# Patient Record
Sex: MALE | Race: WHITE | Employment: FULL TIME | URBAN - METROPOLITAN AREA
[De-identification: names, ages, dates, MRNs, and addresses within clinical notes are randomized per-mention and may not be internally consistent; named-entity substitution may affect disease eponyms.]

---

## 2019-12-06 ENCOUNTER — OFFICE VISIT (OUTPATIENT)
Dept: FAMILY MEDICINE CLINIC | Facility: CLINIC | Age: 29
End: 2019-12-06
Payer: COMMERCIAL

## 2019-12-06 VITALS
RESPIRATION RATE: 18 BRPM | WEIGHT: 315 LBS | SYSTOLIC BLOOD PRESSURE: 140 MMHG | TEMPERATURE: 99 F | HEART RATE: 86 BPM | OXYGEN SATURATION: 98 % | HEIGHT: 72 IN | BODY MASS INDEX: 42.66 KG/M2 | DIASTOLIC BLOOD PRESSURE: 90 MMHG

## 2019-12-06 DIAGNOSIS — Z23 NEED FOR VACCINATION: Primary | ICD-10-CM

## 2019-12-06 DIAGNOSIS — J01.00 ACUTE NON-RECURRENT MAXILLARY SINUSITIS: ICD-10-CM

## 2019-12-06 DIAGNOSIS — J40 BRONCHITIS: ICD-10-CM

## 2019-12-06 PROCEDURE — 99202 OFFICE O/P NEW SF 15 MIN: CPT | Performed by: FAMILY MEDICINE

## 2019-12-06 PROCEDURE — 90471 IMMUNIZATION ADMIN: CPT | Performed by: FAMILY MEDICINE

## 2019-12-06 PROCEDURE — 90686 IIV4 VACC NO PRSV 0.5 ML IM: CPT | Performed by: FAMILY MEDICINE

## 2019-12-06 RX ORDER — AMOXICILLIN AND CLAVULANATE POTASSIUM 875; 125 MG/1; MG/1
1 TABLET, FILM COATED ORAL 2 TIMES DAILY
Qty: 20 TABLET | Refills: 0 | Status: SHIPPED | OUTPATIENT
Start: 2019-12-06 | End: 2019-12-16

## 2019-12-06 RX ORDER — PREDNISONE 20 MG/1
TABLET ORAL
Qty: 10 TABLET | Refills: 0 | Status: SHIPPED | OUTPATIENT
Start: 2019-12-06 | End: 2020-03-13 | Stop reason: ALTCHOICE

## 2019-12-06 NOTE — PROGRESS NOTES
CHIEF COMPLAINT:   Patient presents with:  Cough: x 1mo congestion, tired, bodyaches, cough, chest pressure        HPI:   Lisa Ramirez is a 26year old male who presents for cough and cold sx for  1  months.   Cough started gradually and is described Atraumatic, normocephalic. TM's clear bilaterally. Nostrils patent, nasal mucosa erythematous and boggy. No erythema of the throat. NECK: supple, non-tender. LUNGS: Normal respiratory rate. Normal effort. Dry cough. no wheezing. No rales or crackles. understanding of these issues and agrees to the plan. The patient is asked to return if sx's persist or worsen.

## 2019-12-06 NOTE — PATIENT INSTRUCTIONS
Take antibiotics with food and plenty of water. Eat yogurt or take probiotic daily. ([facility] is a good example of an OTC probiotic)  Make sure to finish the entire antibiotic treatment. Take prednisone-- 2 tabs once daily for 5 days. Take with food.    Veronica Hudson

## 2020-03-13 ENCOUNTER — OFFICE VISIT (OUTPATIENT)
Dept: FAMILY MEDICINE CLINIC | Facility: CLINIC | Age: 30
End: 2020-03-13
Payer: COMMERCIAL

## 2020-03-13 VITALS
TEMPERATURE: 100 F | BODY MASS INDEX: 42.66 KG/M2 | SYSTOLIC BLOOD PRESSURE: 130 MMHG | HEIGHT: 72 IN | DIASTOLIC BLOOD PRESSURE: 70 MMHG | RESPIRATION RATE: 16 BRPM | WEIGHT: 315 LBS | HEART RATE: 90 BPM

## 2020-03-13 DIAGNOSIS — Z00.00 ROUTINE HEALTH MAINTENANCE: Primary | ICD-10-CM

## 2020-03-13 PROCEDURE — 99385 PREV VISIT NEW AGE 18-39: CPT | Performed by: FAMILY MEDICINE

## 2020-03-13 NOTE — PROGRESS NOTES
Patient presents with:  Establish Care: New Pt  Physical    HPI:  Notes that he feels like he is falling apart now that he is 26. Having more aches and pains. Also has concerns about weight.  Notes that even when he was training for a marathon, lowest w obvious abnormality, atraumatic  Ears: normal TM's and external ear canals both ears  Nose: Nares normal. Septum midline. Mucosa normal. No drainage or sinus tenderness.   Throat: lips, mucosa, and tongue normal; teeth and gums normal  Neck: no adenopathy,

## 2020-09-23 PROBLEM — F41.1 GENERALIZED ANXIETY DISORDER: Status: ACTIVE | Noted: 2020-09-23

## 2020-12-17 NOTE — PROGRESS NOTES
Virtual Telephone Check-In    Hyland Hodgkin verbally consents to a Virtual/Telephone Check-In visit on 12/17/20. Patient has been referred to the [facility] website at [email or web address] to review the yearly Consent to Treat document.     Patient unde

## 2021-04-13 ENCOUNTER — TELEPHONE (OUTPATIENT)
Dept: FAMILY MEDICINE CLINIC | Facility: CLINIC | Age: 31
End: 2021-04-13

## 2021-04-13 NOTE — TELEPHONE ENCOUNTER
Please enter lab orders for the patient's upcoming physical appointment. Physical scheduled:    Your appointments     Date & Time Appointment Department [facility])    Apr 20, 2021  4:40 PM CDT Adult Physical with Hellen Ba  [facility], [facility]

## 2021-04-20 ENCOUNTER — OFFICE VISIT (OUTPATIENT)
Dept: FAMILY MEDICINE CLINIC | Facility: CLINIC | Age: 31
End: 2021-04-20
Payer: COMMERCIAL

## 2021-04-20 VITALS
RESPIRATION RATE: 16 BRPM | BODY MASS INDEX: 42.66 KG/M2 | DIASTOLIC BLOOD PRESSURE: 90 MMHG | HEART RATE: 88 BPM | WEIGHT: 315 LBS | OXYGEN SATURATION: 98 % | SYSTOLIC BLOOD PRESSURE: 160 MMHG | TEMPERATURE: 98 F | HEIGHT: 72 IN

## 2021-04-20 DIAGNOSIS — R20.2 NUMBNESS AND TINGLING IN LEFT HAND: ICD-10-CM

## 2021-04-20 DIAGNOSIS — R20.0 NUMBNESS AND TINGLING IN LEFT HAND: ICD-10-CM

## 2021-04-20 DIAGNOSIS — Z00.00 ROUTINE HEALTH MAINTENANCE: Primary | ICD-10-CM

## 2021-04-20 PROCEDURE — 3008F BODY MASS INDEX DOCD: CPT | Performed by: FAMILY MEDICINE

## 2021-04-20 PROCEDURE — 99395 PREV VISIT EST AGE 18-39: CPT | Performed by: FAMILY MEDICINE

## 2021-04-20 PROCEDURE — 3077F SYST BP >= 140 MM HG: CPT | Performed by: FAMILY MEDICINE

## 2021-04-20 PROCEDURE — 3080F DIAST BP >= 90 MM HG: CPT | Performed by: FAMILY MEDICINE

## 2021-04-20 NOTE — PROGRESS NOTES
Patient presents with:  Physical  Numbness: Recieved Moderna vaccine on 03-, numbness on left hand 10 min after receiving the vaccine, has second dose schedule on 03-    HPI:  Notes that he has numbness and tingling in his L hand following wi never smoked. He has never used smokeless tobacco.    ROS:   Review of Systems     HISTORY:  History reviewed. No pertinent past medical history. History reviewed. No pertinent surgical history.    Family History   Adopted: Yes   Problem Relation Age of O Grant Pichardo was seen today for physical and numbness. Diagnoses and all orders for this visit:    Numbness and tingling in left hand  Will give trial of treating like carpal tunnel with braces.      Routine health maintenance  -     CBC, PLATELET; NO DI

## 2021-05-18 ENCOUNTER — OFFICE VISIT (OUTPATIENT)
Dept: FAMILY MEDICINE CLINIC | Facility: CLINIC | Age: 31
End: 2021-05-18
Payer: COMMERCIAL

## 2021-05-18 VITALS
HEIGHT: 72 IN | RESPIRATION RATE: 14 BRPM | WEIGHT: 315 LBS | BODY MASS INDEX: 42.66 KG/M2 | HEART RATE: 94 BPM | OXYGEN SATURATION: 98 % | SYSTOLIC BLOOD PRESSURE: 170 MMHG | DIASTOLIC BLOOD PRESSURE: 100 MMHG | TEMPERATURE: 98 F

## 2021-05-18 DIAGNOSIS — R05.3 CHRONIC COUGH: ICD-10-CM

## 2021-05-18 DIAGNOSIS — E66.01 MORBID OBESITY (HCC): ICD-10-CM

## 2021-05-18 DIAGNOSIS — I10 ESSENTIAL HYPERTENSION: Primary | ICD-10-CM

## 2021-05-18 PROCEDURE — 99214 OFFICE O/P EST MOD 30 MIN: CPT | Performed by: FAMILY MEDICINE

## 2021-05-18 PROCEDURE — 3080F DIAST BP >= 90 MM HG: CPT | Performed by: FAMILY MEDICINE

## 2021-05-18 PROCEDURE — 3077F SYST BP >= 140 MM HG: CPT | Performed by: FAMILY MEDICINE

## 2021-05-18 PROCEDURE — 3008F BODY MASS INDEX DOCD: CPT | Performed by: FAMILY MEDICINE

## 2021-05-18 RX ORDER — AMLODIPINE BESYLATE 5 MG/1
5 TABLET ORAL DAILY
Qty: 30 TABLET | Refills: 1 | Status: SHIPPED | OUTPATIENT
Start: 2021-05-18 | End: 2021-06-15 | Stop reason: ALTCHOICE

## 2021-05-18 NOTE — PROGRESS NOTES
Chief Complaint:  Patient presents with:  Blood Pressure: BP Follow Up      HPI:  This is a 31year old male patient presenting for Blood Pressure (BP Follow Up)    Elevated BP: Noted last visit. Started monitoring at home. Most readings 130-170s/.  D 170/100   Pulse: 94   Resp: 14   Temp: 97.7 °F (36.5 °C)   TempSrc: Temporal   SpO2: 98%   Weight: (!) 344 lb (156 kg)   Height: 6' (1.829 m)     GENERAL: vitals reviewed and listed above, alert, oriented, appears well hydrated and in no acute distress  HE

## 2021-05-24 ENCOUNTER — PATIENT MESSAGE (OUTPATIENT)
Dept: FAMILY MEDICINE CLINIC | Facility: CLINIC | Age: 31
End: 2021-05-24

## 2021-05-24 NOTE — TELEPHONE ENCOUNTER
From: Florence Luxtingham  To: Evin Stroud DO  Sent: 5/24/2021 11:20 AM CDT  Subject: Other    Hi Dr. Nicolle Rodriguez,    I just wanted to give you the one-week update of me having started taking Amlodipine; so far, not huge changes from my perspective.  I've left

## 2021-06-01 ENCOUNTER — PATIENT MESSAGE (OUTPATIENT)
Dept: FAMILY MEDICINE CLINIC | Facility: CLINIC | Age: 31
End: 2021-06-01

## 2021-06-01 DIAGNOSIS — I10 ESSENTIAL HYPERTENSION: Primary | ICD-10-CM

## 2021-06-01 RX ORDER — AMLODIPINE BESYLATE 10 MG/1
10 TABLET ORAL DAILY
Qty: 90 TABLET | Refills: 0 | Status: SHIPPED | OUTPATIENT
Start: 2021-06-01 | End: 2021-08-28

## 2021-06-01 NOTE — TELEPHONE ENCOUNTER
From: Bari Spangler  To:  Deana Chacon DO  Sent: 6/1/2021 9:20 AM CDT  Subject: Non-Urgent Medical Question    Hi Dr Delma May,    Please see attached for my blood pressure readings — we doubled my dose of amlodipine on the 25th and it looks like there'

## 2021-06-15 ENCOUNTER — OFFICE VISIT (OUTPATIENT)
Dept: FAMILY MEDICINE CLINIC | Facility: CLINIC | Age: 31
End: 2021-06-15
Payer: COMMERCIAL

## 2021-06-15 VITALS
WEIGHT: 315 LBS | RESPIRATION RATE: 16 BRPM | BODY MASS INDEX: 42.66 KG/M2 | TEMPERATURE: 98 F | SYSTOLIC BLOOD PRESSURE: 140 MMHG | DIASTOLIC BLOOD PRESSURE: 70 MMHG | HEART RATE: 70 BPM | HEIGHT: 72 IN

## 2021-06-15 DIAGNOSIS — E66.01 MORBID OBESITY (HCC): ICD-10-CM

## 2021-06-15 DIAGNOSIS — I10 ESSENTIAL HYPERTENSION: Primary | ICD-10-CM

## 2021-06-15 DIAGNOSIS — R05.3 CHRONIC COUGH: ICD-10-CM

## 2021-06-15 PROCEDURE — 3078F DIAST BP <80 MM HG: CPT | Performed by: FAMILY MEDICINE

## 2021-06-15 PROCEDURE — 3008F BODY MASS INDEX DOCD: CPT | Performed by: FAMILY MEDICINE

## 2021-06-15 PROCEDURE — 99214 OFFICE O/P EST MOD 30 MIN: CPT | Performed by: FAMILY MEDICINE

## 2021-06-15 PROCEDURE — 3077F SYST BP >= 140 MM HG: CPT | Performed by: FAMILY MEDICINE

## 2021-06-15 RX ORDER — LOSARTAN POTASSIUM 25 MG/1
25 TABLET ORAL DAILY
Qty: 30 TABLET | Refills: 1 | Status: SHIPPED | OUTPATIENT
Start: 2021-06-15 | End: 2021-07-02

## 2021-06-15 NOTE — PROGRESS NOTES
Chief Complaint:  Patient presents with:  Blood Pressure: 1 month Follow Up       HPI:  This is a 31year old male patient presenting for Blood Pressure (1 month Follow Up )    Essential hypertension  Increased to 10mg amlodipine after last visit.  Notes th Take by mouth.        Allergies:    Fennel                  Tightness in Throat, TONGUE                            SWELLING  Shellfish-Derived P*    ANAPHYLAXIS    EXAM:   06/15/21  1120   BP: 140/70   Pulse: 70   Resp: 16   Temp: 97.9 °F (36.6 °C)   TempSr

## 2021-06-16 ENCOUNTER — PATIENT MESSAGE (OUTPATIENT)
Dept: FAMILY MEDICINE CLINIC | Facility: CLINIC | Age: 31
End: 2021-06-16

## 2021-06-16 NOTE — TELEPHONE ENCOUNTER
From: Nolvia Medina  To:  Toro Urbina DO  Sent: 6/16/2021 9:27 AM CDT  Subject: Non-Urgent Medical Question    Hi Dr Natalya Niño,    Not alarmed necessarily, but I wanted to flag that today I did my morning walk with Shayla Raymond and found that my resting heart

## 2021-06-29 ENCOUNTER — PATIENT MESSAGE (OUTPATIENT)
Dept: FAMILY MEDICINE CLINIC | Facility: CLINIC | Age: 31
End: 2021-06-29

## 2021-06-29 NOTE — TELEPHONE ENCOUNTER
From: Tamia Huang  To: Yonny Pinedo DO  Sent: 6/29/2021 6:58 AM CDT  Subject: Other    Hi Dr Sharlotte Boxer,    Here's my blood pressure update. Some progress, though I've seen my diastatic blood pressure drop a little lower than expected once or twice.

## 2021-07-02 ENCOUNTER — TELEPHONE (OUTPATIENT)
Dept: FAMILY MEDICINE CLINIC | Facility: CLINIC | Age: 31
End: 2021-07-02

## 2021-07-02 DIAGNOSIS — I10 ESSENTIAL HYPERTENSION: ICD-10-CM

## 2021-07-02 RX ORDER — LOSARTAN POTASSIUM 50 MG/1
50 TABLET ORAL DAILY
Qty: 90 TABLET | Refills: 0 | Status: SHIPPED | OUTPATIENT
Start: 2021-07-02 | End: 2021-08-28

## 2021-07-02 NOTE — TELEPHONE ENCOUNTER
Pt has been in communication with Dr Vic Alcantara about increasing his Losartan Potassium 25 MG Oral Tab to 50mg and that seems to be working but he will be out of medication before the weekend is over because of the increase can we send over a Rx for 50mg so he

## 2021-07-06 ENCOUNTER — OFFICE VISIT (OUTPATIENT)
Dept: INTERNAL MEDICINE CLINIC | Facility: CLINIC | Age: 31
End: 2021-07-06
Payer: COMMERCIAL

## 2021-07-06 VITALS
WEIGHT: 315 LBS | RESPIRATION RATE: 16 BRPM | HEART RATE: 78 BPM | HEIGHT: 71 IN | BODY MASS INDEX: 44.1 KG/M2 | DIASTOLIC BLOOD PRESSURE: 68 MMHG | SYSTOLIC BLOOD PRESSURE: 130 MMHG

## 2021-07-06 DIAGNOSIS — F43.9 STRESS: ICD-10-CM

## 2021-07-06 DIAGNOSIS — R06.83 SNORING: ICD-10-CM

## 2021-07-06 DIAGNOSIS — R94.31 ABNORMAL EKG: ICD-10-CM

## 2021-07-06 DIAGNOSIS — F41.1 GENERALIZED ANXIETY DISORDER: ICD-10-CM

## 2021-07-06 DIAGNOSIS — Z51.81 THERAPEUTIC DRUG MONITORING: Primary | ICD-10-CM

## 2021-07-06 DIAGNOSIS — I10 ESSENTIAL HYPERTENSION: ICD-10-CM

## 2021-07-06 DIAGNOSIS — E66.01 MORBID OBESITY WITH BMI OF 40.0-44.9, ADULT (HCC): ICD-10-CM

## 2021-07-06 DIAGNOSIS — Z83.3 FAMILY HISTORY OF DIABETES MELLITUS IN FATHER: ICD-10-CM

## 2021-07-06 PROCEDURE — 3075F SYST BP GE 130 - 139MM HG: CPT | Performed by: NURSE PRACTITIONER

## 2021-07-06 PROCEDURE — 99204 OFFICE O/P NEW MOD 45 MIN: CPT | Performed by: NURSE PRACTITIONER

## 2021-07-06 PROCEDURE — 93000 ELECTROCARDIOGRAM COMPLETE: CPT | Performed by: NURSE PRACTITIONER

## 2021-07-06 PROCEDURE — 3078F DIAST BP <80 MM HG: CPT | Performed by: NURSE PRACTITIONER

## 2021-07-06 PROCEDURE — 3008F BODY MASS INDEX DOCD: CPT | Performed by: NURSE PRACTITIONER

## 2021-07-06 NOTE — PROGRESS NOTES
HISTORY OF PRESENT ILLNESS  Patient presents with:  Weight Problem: referred by Majo Clement, never tried any weight loss meds, open to trying if necessary.     Pattie Carlos Manuel is a 27year old male new to our office today for initiation of medical weight l cheese. Regularly target 800-1000 rebeca, last night was a homemade chicken & chickpea tikka (400 g, ~330 rebeca) with 3/4 cup white rice (154 rebeca). Typically i would have a second serving but felt full so did not this time.  2x granny miller apples, bag of popcor denies palpitations or pedal edema  GI: denies abdominal pain, distention, No N/V/D/C  MUSCULOSKELETAL: denies back pain, joint pains   NEURO: denies headaches or dizziness  ENDOCRINE: denies any excess hunger, urination or thirst, denies any purple striae A1C, ELECTROCARDIOGRAM, COMPLETE, OP REFERRAL         TO DIETITIAN EMG WLC (WLC USE ONLY), CARD ECHO         STRESS ECHO/REST AND STRESS(CPT=93350/41894 DMG        15510), SARS-COV-2 BY PCR (ALIDAMIANTY)    (E66.01,  Z68.41) Morbid obesity with BMI of 40.0-4 skipping meals   · Needs to incorporate exercise regimen FITTE: ACSM recommendations (150-300 minutes/ week in active weight loss)   · Follow up with dietitian and psychologist as recommended. · Discussed the role of sleep and stress in weight management. protein or vegetables (4-6 servings of vegetables per day)  6. Get a good night of sleep  7. Try to decrease stress in life     Please download apps:  1.  \"My Fitness Pal\" (other option is Lose it)) to help you to monitor daily dietary intake and you will

## 2021-07-06 NOTE — PATIENT INSTRUCTIONS
We are here to support you with weight loss, but please remember that you still need your primary care provider for your routine health maintenance.       PLAN:  Go to the lab for blood work   Orders for stress echo  Follow up with me in 1 month  Schedule f section-- \"nutrients\" and it will break down your macros for the day (ie. Protein, carbs, fibers, sugars and fats). Try to stay within these numbers daily     2.  \"7 minute workout\" to help with exercise/activity which takes 7 minutes of your day and th

## 2021-07-13 ENCOUNTER — TELEPHONE (OUTPATIENT)
Dept: INTERNAL MEDICINE CLINIC | Facility: CLINIC | Age: 31
End: 2021-07-13

## 2021-07-13 ENCOUNTER — OFFICE VISIT (OUTPATIENT)
Dept: FAMILY MEDICINE CLINIC | Facility: CLINIC | Age: 31
End: 2021-07-13
Payer: COMMERCIAL

## 2021-07-13 VITALS
HEART RATE: 72 BPM | HEIGHT: 70.25 IN | WEIGHT: 313 LBS | BODY MASS INDEX: 44.81 KG/M2 | RESPIRATION RATE: 16 BRPM | SYSTOLIC BLOOD PRESSURE: 140 MMHG | TEMPERATURE: 98 F | OXYGEN SATURATION: 97 % | DIASTOLIC BLOOD PRESSURE: 82 MMHG

## 2021-07-13 DIAGNOSIS — I10 ESSENTIAL HYPERTENSION: ICD-10-CM

## 2021-07-13 DIAGNOSIS — F41.1 GENERALIZED ANXIETY DISORDER: ICD-10-CM

## 2021-07-13 DIAGNOSIS — R94.31 ABNORMAL EKG: ICD-10-CM

## 2021-07-13 DIAGNOSIS — R06.83 SNORING: ICD-10-CM

## 2021-07-13 DIAGNOSIS — F43.9 STRESS: ICD-10-CM

## 2021-07-13 DIAGNOSIS — E66.01 MORBID OBESITY WITH BMI OF 40.0-44.9, ADULT (HCC): Primary | ICD-10-CM

## 2021-07-13 DIAGNOSIS — I10 ESSENTIAL HYPERTENSION: Primary | ICD-10-CM

## 2021-07-13 DIAGNOSIS — E66.01 MORBID OBESITY (HCC): ICD-10-CM

## 2021-07-13 PROCEDURE — 3079F DIAST BP 80-89 MM HG: CPT | Performed by: FAMILY MEDICINE

## 2021-07-13 PROCEDURE — 99214 OFFICE O/P EST MOD 30 MIN: CPT | Performed by: FAMILY MEDICINE

## 2021-07-13 PROCEDURE — 3077F SYST BP >= 140 MM HG: CPT | Performed by: FAMILY MEDICINE

## 2021-07-13 PROCEDURE — 3008F BODY MASS INDEX DOCD: CPT | Performed by: FAMILY MEDICINE

## 2021-07-13 NOTE — TELEPHONE ENCOUNTER
Note from referral department regarding Stress Echo Test ordered - DENIED    Lacey Root [facility] Clinical Staff  Good Morning,     Per Dipak Wilkerson this test did not meet medical necessity and has been denied. Per their guidelines:     Based on [employer]

## 2021-07-13 NOTE — PROGRESS NOTES
Chief Complaint:  Patient presents with:  Blood Pressure: follow up    HPI:  This is a 27year old male patient presenting for Blood Pressure (follow up)    HTN: Last visit increased losartan to 50mg and cont amlodipine.  Notes that home readings vary from kg)   Height: 5' 10.25\" (1.784 m)     GENERAL: vitals reviewed and listed above, alert, oriented, appears well hydrated and in no acute distress  HEENT: atraumatic, conjunctiva clear, no obvious abnormalities on inspection   LUNGS: clear to auscultation b

## 2021-07-15 NOTE — TELEPHONE ENCOUNTER
Message to [facility] to ask if I can order the testing so it can be checked for coverage - okay per [facility]. Testing ordered.

## 2021-07-20 ENCOUNTER — PATIENT MESSAGE (OUTPATIENT)
Dept: FAMILY MEDICINE CLINIC | Facility: CLINIC | Age: 31
End: 2021-07-20

## 2021-07-21 NOTE — TELEPHONE ENCOUNTER
From: Mathew Moralez  To: Matt Click, DO  Sent: 7/20/2021 8:46 PM CDT  Subject: Other    Hi Dr Matias Likes,    Second to last day of work today -- here's my rolling blood pressure measurements!

## 2021-08-16 ENCOUNTER — OFFICE VISIT (OUTPATIENT)
Dept: FAMILY MEDICINE CLINIC | Facility: CLINIC | Age: 31
End: 2021-08-16
Payer: COMMERCIAL

## 2021-08-16 VITALS
SYSTOLIC BLOOD PRESSURE: 130 MMHG | HEART RATE: 88 BPM | RESPIRATION RATE: 16 BRPM | HEIGHT: 71.75 IN | WEIGHT: 311 LBS | TEMPERATURE: 97 F | DIASTOLIC BLOOD PRESSURE: 70 MMHG | BODY MASS INDEX: 42.59 KG/M2

## 2021-08-16 DIAGNOSIS — I10 ESSENTIAL HYPERTENSION: Primary | ICD-10-CM

## 2021-08-16 PROCEDURE — 3075F SYST BP GE 130 - 139MM HG: CPT | Performed by: FAMILY MEDICINE

## 2021-08-16 PROCEDURE — 3078F DIAST BP <80 MM HG: CPT | Performed by: FAMILY MEDICINE

## 2021-08-16 PROCEDURE — 3008F BODY MASS INDEX DOCD: CPT | Performed by: FAMILY MEDICINE

## 2021-08-16 PROCEDURE — 99213 OFFICE O/P EST LOW 20 MIN: CPT | Performed by: FAMILY MEDICINE

## 2021-08-16 NOTE — PROGRESS NOTES
Chief Complaint:  Patient presents with:  Blood Pressure: 1 month Follow Up, pt states he has been ranging 130/90s     HPI:  This is a 27year old male patient presenting for Blood Pressure (1 month Follow Up, pt states he has been ranging 130/90s )    HTN 11.75\" (1.822 m)     GENERAL: vitals reviewed and listed above, alert, oriented, appears well hydrated and in no acute distress  HEENT: atraumatic, conjunctiva clear, no obvious abnormalities on inspection   LUNGS: clear to auscultation bilaterally, no wh

## 2021-08-28 DIAGNOSIS — I10 ESSENTIAL HYPERTENSION: ICD-10-CM

## 2021-08-31 RX ORDER — AMLODIPINE BESYLATE 10 MG/1
10 TABLET ORAL DAILY
Qty: 90 TABLET | Refills: 0 | Status: SHIPPED | OUTPATIENT
Start: 2021-08-31 | End: 2021-12-02

## 2021-08-31 RX ORDER — LOSARTAN POTASSIUM 50 MG/1
50 TABLET ORAL DAILY
Qty: 90 TABLET | Refills: 0 | Status: SHIPPED | OUTPATIENT
Start: 2021-08-31 | End: 2021-12-02

## 2021-08-31 NOTE — TELEPHONE ENCOUNTER
Requested Prescriptions     Pending Prescriptions Disp Refills   • amLODIPine 10 MG Oral Tab 90 tablet 0     Sig: Take 1 tablet (10 mg total) by mouth daily.    • losartan Potassium 50 MG Oral Tab 90 tablet 0     Sig: Take 1 tablet (50 mg total) by mouth da

## 2021-10-12 ENCOUNTER — IMMUNIZATION (OUTPATIENT)
Dept: FAMILY MEDICINE CLINIC | Facility: CLINIC | Age: 31
End: 2021-10-12
Payer: COMMERCIAL

## 2021-10-12 DIAGNOSIS — Z23 NEED FOR VACCINATION: Primary | ICD-10-CM

## 2021-10-12 PROCEDURE — 90471 IMMUNIZATION ADMIN: CPT | Performed by: FAMILY MEDICINE

## 2021-10-12 PROCEDURE — 90686 IIV4 VACC NO PRSV 0.5 ML IM: CPT | Performed by: FAMILY MEDICINE

## 2021-11-12 ENCOUNTER — PATIENT MESSAGE (OUTPATIENT)
Dept: FAMILY MEDICINE CLINIC | Facility: CLINIC | Age: 31
End: 2021-11-12

## 2021-11-12 DIAGNOSIS — Z30.09 SCREENING AND EVALUATION FOR VASECTOMY: Primary | ICD-10-CM

## 2021-11-12 NOTE — TELEPHONE ENCOUNTER
From: Deisi Calderon  To: Vince Phelps DO  Sent: 11/12/2021 7:26 AM CST  Subject: Vasectomy     Hetony Lord. I was hoping to get a vasectomy — how would I go about getting that started?

## 2021-11-27 ENCOUNTER — IMMUNIZATION (OUTPATIENT)
Dept: LAB | Facility: HOSPITAL | Age: 31
End: 2021-11-27
Attending: EMERGENCY MEDICINE
Payer: COMMERCIAL

## 2021-11-27 DIAGNOSIS — Z23 NEED FOR VACCINATION: Primary | ICD-10-CM

## 2021-11-27 PROCEDURE — 0064A SARSCOV2 VAC 50MCG/0.25ML IM: CPT

## 2021-12-02 DIAGNOSIS — I10 ESSENTIAL HYPERTENSION: ICD-10-CM

## 2021-12-03 RX ORDER — LOSARTAN POTASSIUM 50 MG/1
50 TABLET ORAL DAILY
Qty: 90 TABLET | Refills: 0 | Status: SHIPPED | OUTPATIENT
Start: 2021-12-03 | End: 2022-01-10

## 2021-12-03 RX ORDER — AMLODIPINE BESYLATE 10 MG/1
10 TABLET ORAL DAILY
Qty: 90 TABLET | Refills: 0 | Status: SHIPPED | OUTPATIENT
Start: 2021-12-03

## 2021-12-03 NOTE — TELEPHONE ENCOUNTER
Requested Prescriptions     Pending Prescriptions Disp Refills   • amLODIPine 10 MG Oral Tab 90 tablet 0     Sig: Take 1 tablet (10 mg total) by mouth daily. • losartan 50 MG Oral Tab 90 tablet 0     Sig: Take 1 tablet (50 mg total) by mouth daily.      L

## 2022-01-07 DIAGNOSIS — I10 ESSENTIAL HYPERTENSION: ICD-10-CM

## 2022-01-07 RX ORDER — LOSARTAN POTASSIUM 50 MG/1
50 TABLET ORAL DAILY
Qty: 90 TABLET | Refills: 0 | Status: CANCELLED | OUTPATIENT
Start: 2022-01-07

## 2022-01-10 RX ORDER — VALSARTAN 80 MG/1
80 TABLET ORAL DAILY
Qty: 90 TABLET | Refills: 0 | Status: SHIPPED | OUTPATIENT
Start: 2022-01-10

## 2022-01-10 NOTE — TELEPHONE ENCOUNTER
Requested Prescriptions     Pending Prescriptions Disp Refills   • losartan 50 MG Oral Tab 90 tablet 0     Sig: Take 1 tablet (50 mg total) by mouth daily.      LOV 8/16/2021     Patient was asked to follow-up in: April 2022    Appointment scheduled: Visit toileting/walking/standing

## 2022-01-10 NOTE — TELEPHONE ENCOUNTER
Sent in valsartan. Please let patent know of the change. To monitor for any significant BP changes with med change. Please let me know if you have any questions.   Sharyn Connelly,  1/10/2022 5:32 PM

## 2022-02-10 ENCOUNTER — TELEPHONE (OUTPATIENT)
Dept: FAMILY MEDICINE CLINIC | Facility: CLINIC | Age: 32
End: 2022-02-10

## 2022-02-10 NOTE — TELEPHONE ENCOUNTER
Pt is calling his son, Robbin Vera is coming in for his well child on 2/14/22 and pt has hurt his shoulder he wondered if Dr. Delvin Multani will double book him just to take a look at it and may give him a referral. Please call

## 2022-02-21 ENCOUNTER — PATIENT MESSAGE (OUTPATIENT)
Dept: FAMILY MEDICINE CLINIC | Facility: CLINIC | Age: 32
End: 2022-02-21

## 2022-02-21 NOTE — TELEPHONE ENCOUNTER
From: Ventura Houser  To: Cole Mathis DO  Sent: 2/21/2022 6:37 AM CST  Subject: Blood pressure    Hi Dr Song Bray,    Since you switched me to Valsartan (over Losartan) my blood pressure has been creeping back up to ~150-160/80 (this morning was 155/85). To be fair, my physical activity has gone down a bit with the weather being as cold as it has been, so I will try and ramp that back up as well, but I wanted to flag this in case the new medication needs to be switched out.     Jenny Bishop

## 2022-02-21 NOTE — TELEPHONE ENCOUNTER
Dr. Jayla Trevizo switched losartan to valsartan due to backorder. Pt reports BP has increased since change. Routed to Dr. Jayla Trevizo - do you want to adjust dose?

## 2022-02-23 RX ORDER — VALSARTAN 160 MG/1
160 TABLET ORAL DAILY
Qty: 90 TABLET | Refills: 0 | Status: SHIPPED | OUTPATIENT
Start: 2022-02-23

## 2022-02-28 ENCOUNTER — OFFICE VISIT (OUTPATIENT)
Dept: FAMILY MEDICINE CLINIC | Facility: CLINIC | Age: 32
End: 2022-02-28
Payer: COMMERCIAL

## 2022-02-28 VITALS
RESPIRATION RATE: 16 BRPM | DIASTOLIC BLOOD PRESSURE: 76 MMHG | HEIGHT: 71 IN | HEART RATE: 80 BPM | SYSTOLIC BLOOD PRESSURE: 138 MMHG | TEMPERATURE: 99 F | BODY MASS INDEX: 44.1 KG/M2 | WEIGHT: 315 LBS

## 2022-02-28 DIAGNOSIS — M25.512 ACUTE PAIN OF LEFT SHOULDER: Primary | ICD-10-CM

## 2022-02-28 DIAGNOSIS — I10 ESSENTIAL HYPERTENSION: ICD-10-CM

## 2022-02-28 PROBLEM — E66.01 MORBID (SEVERE) OBESITY DUE TO EXCESS CALORIES (HCC): Status: ACTIVE | Noted: 2022-02-28

## 2022-02-28 PROCEDURE — 3008F BODY MASS INDEX DOCD: CPT | Performed by: FAMILY MEDICINE

## 2022-02-28 PROCEDURE — 3078F DIAST BP <80 MM HG: CPT | Performed by: FAMILY MEDICINE

## 2022-02-28 PROCEDURE — 3075F SYST BP GE 130 - 139MM HG: CPT | Performed by: FAMILY MEDICINE

## 2022-02-28 PROCEDURE — 99214 OFFICE O/P EST MOD 30 MIN: CPT | Performed by: FAMILY MEDICINE

## 2022-03-05 ENCOUNTER — HOSPITAL ENCOUNTER (OUTPATIENT)
Dept: GENERAL RADIOLOGY | Facility: HOSPITAL | Age: 32
Discharge: HOME | End: 2022-03-05
Attending: FAMILY MEDICINE
Payer: COMMERCIAL

## 2022-03-05 DIAGNOSIS — M25.512 ACUTE PAIN OF LEFT SHOULDER: ICD-10-CM

## 2022-03-05 PROCEDURE — 73030 X-RAY EXAM OF SHOULDER: CPT | Performed by: FAMILY MEDICINE

## 2022-03-07 RX ORDER — AMLODIPINE BESYLATE 10 MG/1
TABLET ORAL
Qty: 90 TABLET | Refills: 0 | Status: SHIPPED | OUTPATIENT
Start: 2022-03-07

## 2022-03-09 ENCOUNTER — PATIENT MESSAGE (OUTPATIENT)
Dept: FAMILY MEDICINE CLINIC | Facility: CLINIC | Age: 32
End: 2022-03-09

## 2022-03-21 ENCOUNTER — PATIENT MESSAGE (OUTPATIENT)
Dept: FAMILY MEDICINE CLINIC | Facility: CLINIC | Age: 32
End: 2022-03-21

## 2022-03-21 NOTE — TELEPHONE ENCOUNTER
From: Yessica Paul  To: Jase Toussaint DO  Sent: 3/21/2022 1:18 PM CDT  Subject: PT Referral    Hi Dr Jeramie Gee, could you please give me a formal referral for PT? I finally got an appointment with [facility] on Wednesday.

## 2022-06-26 DIAGNOSIS — I10 ESSENTIAL HYPERTENSION: ICD-10-CM

## 2022-06-26 DIAGNOSIS — Z00.00 ROUTINE GENERAL MEDICAL EXAMINATION AT A HEALTH CARE FACILITY: Primary | ICD-10-CM

## 2022-06-29 RX ORDER — VALSARTAN 160 MG/1
160 TABLET ORAL DAILY
Qty: 90 TABLET | Refills: 0 | Status: SHIPPED | OUTPATIENT
Start: 2022-06-29

## 2022-06-29 RX ORDER — AMLODIPINE BESYLATE 10 MG/1
10 TABLET ORAL DAILY
Qty: 90 TABLET | Refills: 0 | Status: SHIPPED | OUTPATIENT
Start: 2022-06-29

## 2022-06-29 NOTE — TELEPHONE ENCOUNTER
Pt is calling he is completely out of the medication today can we please send it today.     Amlodipine 10 mg and Valsartan 160 mg to [facility]

## 2022-09-25 ENCOUNTER — IMMUNIZATION (OUTPATIENT)
Dept: LAB | Age: 32
End: 2022-09-25
Attending: EMERGENCY MEDICINE

## 2022-09-25 DIAGNOSIS — Z23 NEED FOR VACCINATION: Primary | ICD-10-CM

## 2022-09-25 PROCEDURE — 0134A SARSCOV2 VAC BVL 50MCG/0.5ML: CPT

## 2022-10-04 ENCOUNTER — HOSPITAL ENCOUNTER (EMERGENCY)
Facility: HOSPITAL | Age: 32
Discharge: HOME | End: 2022-10-04
Attending: EMERGENCY MEDICINE
Payer: COMMERCIAL

## 2022-10-04 ENCOUNTER — APPOINTMENT (OUTPATIENT)
Dept: ULTRASOUND IMAGING | Facility: HOSPITAL | Age: 32
End: 2022-10-04
Attending: EMERGENCY MEDICINE
Payer: COMMERCIAL

## 2022-10-04 ENCOUNTER — HOSPITAL ENCOUNTER (OUTPATIENT)
Age: 32
Discharge: ANOTHER HOSPITAL | End: 2022-10-04
Payer: COMMERCIAL

## 2022-10-04 ENCOUNTER — APPOINTMENT (OUTPATIENT)
Dept: CT IMAGING | Facility: HOSPITAL | Age: 32
End: 2022-10-04
Attending: EMERGENCY MEDICINE
Payer: COMMERCIAL

## 2022-10-04 VITALS
OXYGEN SATURATION: 98 % | WEIGHT: 315 LBS | HEART RATE: 77 BPM | SYSTOLIC BLOOD PRESSURE: 148 MMHG | TEMPERATURE: 98 F | DIASTOLIC BLOOD PRESSURE: 90 MMHG | RESPIRATION RATE: 18 BRPM | BODY MASS INDEX: 44 KG/M2

## 2022-10-04 VITALS
OXYGEN SATURATION: 98 % | DIASTOLIC BLOOD PRESSURE: 75 MMHG | HEART RATE: 80 BPM | BODY MASS INDEX: 44 KG/M2 | SYSTOLIC BLOOD PRESSURE: 156 MMHG | TEMPERATURE: 98 F | RESPIRATION RATE: 15 BRPM | WEIGHT: 315 LBS

## 2022-10-04 DIAGNOSIS — R11.2 NAUSEA AND VOMITING IN ADULT: ICD-10-CM

## 2022-10-04 DIAGNOSIS — R10.84 ABDOMINAL PAIN, GENERALIZED: Primary | ICD-10-CM

## 2022-10-04 DIAGNOSIS — R94.31 ABNORMAL EKG: ICD-10-CM

## 2022-10-04 DIAGNOSIS — R10.13 EPIGASTRIC PAIN: Primary | ICD-10-CM

## 2022-10-04 LAB
ALBUMIN SERPL-MCNC: 4.3 G/DL (ref 3.4–5)
ALBUMIN/GLOB SERPL: 1.1 {RATIO} (ref 1–2)
ALP LIVER SERPL-CCNC: 52 U/L
ALT SERPL-CCNC: 65 U/L
ANION GAP SERPL CALC-SCNC: 4 MMOL/L (ref 0–18)
AST SERPL-CCNC: 25 U/L (ref 15–37)
ATRIAL RATE: 70 BPM
ATRIAL RATE: 80 BPM
BASOPHILS # BLD AUTO: 0.04 X10(3) UL (ref 0–0.2)
BASOPHILS NFR BLD AUTO: 0.5 %
BILIRUB SERPL-MCNC: 0.7 MG/DL (ref 0.1–2)
BUN BLD-MCNC: 14 MG/DL (ref 7–18)
CALCIUM BLD-MCNC: 9.3 MG/DL (ref 8.5–10.1)
CHLORIDE SERPL-SCNC: 110 MMOL/L (ref 98–112)
CO2 SERPL-SCNC: 25 MMOL/L (ref 21–32)
CREAT BLD-MCNC: 0.75 MG/DL
EOSINOPHIL # BLD AUTO: 0.06 X10(3) UL (ref 0–0.7)
EOSINOPHIL NFR BLD AUTO: 0.7 %
ERYTHROCYTE [DISTWIDTH] IN BLOOD BY AUTOMATED COUNT: 13.4 %
GFR SERPLBLD BASED ON 1.73 SQ M-ARVRAT: 122 ML/MIN/1.73M2 (ref 60–?)
GLOBULIN PLAS-MCNC: 3.8 G/DL (ref 2.8–4.4)
GLUCOSE BLD-MCNC: 90 MG/DL (ref 70–99)
GLUCOSE BLD-MCNC: 93 MG/DL (ref 70–99)
HCT VFR BLD AUTO: 44.3 %
HGB BLD-MCNC: 15.1 G/DL
IMM GRANULOCYTES # BLD AUTO: 0.04 X10(3) UL (ref 0–1)
IMM GRANULOCYTES NFR BLD: 0.5 %
LIPASE SERPL-CCNC: 109 U/L (ref 73–393)
LYMPHOCYTES # BLD AUTO: 2.55 X10(3) UL (ref 1–4)
LYMPHOCYTES NFR BLD AUTO: 29.5 %
MCH RBC QN AUTO: 29.3 PG (ref 26–34)
MCHC RBC AUTO-ENTMCNC: 34.1 G/DL (ref 31–37)
MCV RBC AUTO: 86 FL
MONOCYTES # BLD AUTO: 0.56 X10(3) UL (ref 0.1–1)
MONOCYTES NFR BLD AUTO: 6.5 %
NEUTROPHILS # BLD AUTO: 5.39 X10 (3) UL (ref 1.5–7.7)
NEUTROPHILS # BLD AUTO: 5.39 X10(3) UL (ref 1.5–7.7)
NEUTROPHILS NFR BLD AUTO: 62.3 %
OSMOLALITY SERPL CALC.SUM OF ELEC: 288 MOSM/KG (ref 275–295)
P AXIS: 29 DEGREES
P AXIS: 50 DEGREES
P-R INTERVAL: 164 MS
P-R INTERVAL: 166 MS
PLATELET # BLD AUTO: 242 10(3)UL (ref 150–450)
POTASSIUM SERPL-SCNC: 3.8 MMOL/L (ref 3.5–5.1)
PROT SERPL-MCNC: 8.1 G/DL (ref 6.4–8.2)
Q-T INTERVAL: 384 MS
Q-T INTERVAL: 394 MS
QRS DURATION: 110 MS
QRS DURATION: 114 MS
QTC CALCULATION (BEZET): 425 MS
QTC CALCULATION (BEZET): 442 MS
R AXIS: -1 DEGREES
R AXIS: 5 DEGREES
RBC # BLD AUTO: 5.15 X10(6)UL
SARS-COV-2 RNA RESP QL NAA+PROBE: NOT DETECTED
SODIUM SERPL-SCNC: 139 MMOL/L (ref 136–145)
T AXIS: 104 DEGREES
T AXIS: 119 DEGREES
VENTRICULAR RATE: 70 BPM
VENTRICULAR RATE: 80 BPM
WBC # BLD AUTO: 8.6 X10(3) UL (ref 4–11)

## 2022-10-04 PROCEDURE — 93010 ELECTROCARDIOGRAM REPORT: CPT

## 2022-10-04 PROCEDURE — 93005 ELECTROCARDIOGRAM TRACING: CPT

## 2022-10-04 PROCEDURE — 85025 COMPLETE CBC W/AUTO DIFF WBC: CPT | Performed by: EMERGENCY MEDICINE

## 2022-10-04 PROCEDURE — 85025 COMPLETE CBC W/AUTO DIFF WBC: CPT

## 2022-10-04 PROCEDURE — 96361 HYDRATE IV INFUSION ADD-ON: CPT

## 2022-10-04 PROCEDURE — 99215 OFFICE O/P EST HI 40 MIN: CPT | Performed by: NURSE PRACTITIONER

## 2022-10-04 PROCEDURE — 93000 ELECTROCARDIOGRAM COMPLETE: CPT | Performed by: NURSE PRACTITIONER

## 2022-10-04 PROCEDURE — 80053 COMPREHEN METABOLIC PANEL: CPT | Performed by: EMERGENCY MEDICINE

## 2022-10-04 PROCEDURE — 99285 EMERGENCY DEPT VISIT HI MDM: CPT

## 2022-10-04 PROCEDURE — 96372 THER/PROPH/DIAG INJ SC/IM: CPT

## 2022-10-04 PROCEDURE — 82962 GLUCOSE BLOOD TEST: CPT

## 2022-10-04 PROCEDURE — 83690 ASSAY OF LIPASE: CPT | Performed by: EMERGENCY MEDICINE

## 2022-10-04 PROCEDURE — 76700 US EXAM ABDOM COMPLETE: CPT | Performed by: EMERGENCY MEDICINE

## 2022-10-04 PROCEDURE — 96375 TX/PRO/DX INJ NEW DRUG ADDON: CPT

## 2022-10-04 PROCEDURE — 80053 COMPREHEN METABOLIC PANEL: CPT

## 2022-10-04 PROCEDURE — 74177 CT ABD & PELVIS W/CONTRAST: CPT | Performed by: EMERGENCY MEDICINE

## 2022-10-04 PROCEDURE — C9113 INJ PANTOPRAZOLE SODIUM, VIA: HCPCS | Performed by: EMERGENCY MEDICINE

## 2022-10-04 PROCEDURE — 96374 THER/PROPH/DIAG INJ IV PUSH: CPT

## 2022-10-04 RX ORDER — DICYCLOMINE HYDROCHLORIDE 10 MG/ML
20 INJECTION INTRAMUSCULAR ONCE
Status: COMPLETED | OUTPATIENT
Start: 2022-10-04 | End: 2022-10-04

## 2022-10-04 RX ORDER — DICYCLOMINE HCL 20 MG
20 TABLET ORAL 4 TIMES DAILY PRN
Qty: 30 TABLET | Refills: 0 | Status: SHIPPED | OUTPATIENT
Start: 2022-10-04 | End: 2022-11-03

## 2022-10-04 RX ORDER — ONDANSETRON 2 MG/ML
INJECTION INTRAMUSCULAR; INTRAVENOUS
Status: COMPLETED
Start: 2022-10-04 | End: 2022-10-04

## 2022-10-04 RX ORDER — HYDROMORPHONE HYDROCHLORIDE 1 MG/ML
0.5 INJECTION, SOLUTION INTRAMUSCULAR; INTRAVENOUS; SUBCUTANEOUS ONCE
Status: COMPLETED | OUTPATIENT
Start: 2022-10-04 | End: 2022-10-04

## 2022-10-04 RX ORDER — ONDANSETRON 4 MG/1
4 TABLET, ORALLY DISINTEGRATING ORAL EVERY 4 HOURS PRN
Qty: 10 TABLET | Refills: 0 | Status: SHIPPED | OUTPATIENT
Start: 2022-10-04 | End: 2022-10-11

## 2022-10-04 RX ORDER — ONDANSETRON 4 MG/1
4 TABLET, ORALLY DISINTEGRATING ORAL ONCE
Status: COMPLETED | OUTPATIENT
Start: 2022-10-04 | End: 2022-10-04

## 2022-10-04 RX ORDER — PANTOPRAZOLE SODIUM 20 MG/1
20 TABLET, DELAYED RELEASE ORAL DAILY
Qty: 30 TABLET | Refills: 0 | Status: SHIPPED | OUTPATIENT
Start: 2022-10-04 | End: 2022-11-03

## 2022-10-04 RX ORDER — ONDANSETRON 2 MG/ML
4 INJECTION INTRAMUSCULAR; INTRAVENOUS ONCE
Status: COMPLETED | OUTPATIENT
Start: 2022-10-04 | End: 2022-10-04

## 2022-10-04 NOTE — ED INITIAL ASSESSMENT (HPI)
Pt c/o escalating nausea x1 week, only 1 episode of vomiting this morning. Denies diarrhea. Pt rpt losing 10lbs in the past week.  Although, Pt has been eating and drinking

## 2022-10-04 NOTE — ED INITIAL ASSESSMENT (HPI)
Patient reports epigastric pain and nausea with eating x1 week. Worsening today when began vomiting. 10lb weight loss in 1 week. Denies CP, MOLINA, fever.

## 2022-10-10 ENCOUNTER — TELEPHONE (OUTPATIENT)
Dept: FAMILY MEDICINE CLINIC | Facility: CLINIC | Age: 32
End: 2022-10-10

## 2022-10-10 DIAGNOSIS — Z00.00 ROUTINE HEALTH MAINTENANCE: Primary | ICD-10-CM

## 2022-10-10 RX ORDER — VALSARTAN 160 MG/1
160 TABLET ORAL DAILY
Qty: 90 TABLET | Refills: 0 | Status: SHIPPED | OUTPATIENT
Start: 2022-10-10

## 2022-10-10 RX ORDER — AMLODIPINE BESYLATE 10 MG/1
10 TABLET ORAL DAILY
Qty: 90 TABLET | Refills: 0 | Status: SHIPPED | OUTPATIENT
Start: 2022-10-10

## 2022-10-10 NOTE — TELEPHONE ENCOUNTER
Please enter lab orders for the patient's upcoming physical appointment. Physical scheduled: Your appointments     Date & Time Appointment Department [facility])    Nov 18, 2022  1:00 PM CST Physical - Established with DO Mali Hernandez [address], [address], [place]  ([facility])            [address] Jaqueline [place] [phone]         Preferred lab: [facility])     The patient has been notified to complete fasting labs prior to their physical appointment.

## 2022-10-10 NOTE — TELEPHONE ENCOUNTER
Pt has scheduled physical for 11/18/22 with Dr. Maira Mayfield. Please send enough medication to last until appt time. Pt is completely out. [facility] on [address].

## 2022-11-26 ENCOUNTER — OFFICE VISIT (OUTPATIENT)
Dept: FAMILY MEDICINE CLINIC | Facility: CLINIC | Age: 32
End: 2022-11-26
Payer: COMMERCIAL

## 2022-11-26 VITALS
WEIGHT: 315 LBS | HEART RATE: 81 BPM | BODY MASS INDEX: 44.1 KG/M2 | DIASTOLIC BLOOD PRESSURE: 80 MMHG | SYSTOLIC BLOOD PRESSURE: 142 MMHG | HEIGHT: 71 IN | OXYGEN SATURATION: 98 % | RESPIRATION RATE: 18 BRPM | TEMPERATURE: 98 F

## 2022-11-26 DIAGNOSIS — H65.91 RIGHT NON-SUPPURATIVE OTITIS MEDIA: Primary | ICD-10-CM

## 2022-11-26 DIAGNOSIS — J06.9 VIRAL UPPER RESPIRATORY TRACT INFECTION: ICD-10-CM

## 2022-11-26 PROCEDURE — 3008F BODY MASS INDEX DOCD: CPT | Performed by: NURSE PRACTITIONER

## 2022-11-26 PROCEDURE — 87637 SARSCOV2&INF A&B&RSV AMP PRB: CPT | Performed by: NURSE PRACTITIONER

## 2022-11-26 PROCEDURE — 3077F SYST BP >= 140 MM HG: CPT | Performed by: NURSE PRACTITIONER

## 2022-11-26 PROCEDURE — 3079F DIAST BP 80-89 MM HG: CPT | Performed by: NURSE PRACTITIONER

## 2022-11-26 PROCEDURE — 99213 OFFICE O/P EST LOW 20 MIN: CPT | Performed by: NURSE PRACTITIONER

## 2022-11-26 RX ORDER — AMOXICILLIN AND CLAVULANATE POTASSIUM 875; 125 MG/1; MG/1
1 TABLET, FILM COATED ORAL 2 TIMES DAILY
Qty: 20 TABLET | Refills: 0 | Status: SHIPPED | OUTPATIENT
Start: 2022-11-26 | End: 2022-12-06

## 2022-11-27 LAB
FLUAV + FLUBV RNA SPEC NAA+PROBE: NOT DETECTED
FLUAV + FLUBV RNA SPEC NAA+PROBE: NOT DETECTED
RSV RNA SPEC NAA+PROBE: NOT DETECTED
SARS-COV-2 RNA RESP QL NAA+PROBE: NOT DETECTED

## 2023-01-12 RX ORDER — AMLODIPINE BESYLATE 10 MG/1
10 TABLET ORAL DAILY
Qty: 90 TABLET | Refills: 0 | Status: SHIPPED | OUTPATIENT
Start: 2023-01-12

## 2023-01-12 RX ORDER — VALSARTAN 160 MG/1
160 TABLET ORAL DAILY
Qty: 90 TABLET | Refills: 0 | Status: SHIPPED | OUTPATIENT
Start: 2023-01-12

## 2023-01-27 ENCOUNTER — LAB ENCOUNTER (OUTPATIENT)
Dept: LAB | Age: 33
End: 2023-01-27
Attending: INTERNAL MEDICINE
Payer: COMMERCIAL

## 2023-01-27 ENCOUNTER — LAB ENCOUNTER (OUTPATIENT)
Dept: LAB | Age: 33
End: 2023-01-27
Attending: FAMILY MEDICINE
Payer: COMMERCIAL

## 2023-01-27 DIAGNOSIS — R19.8 IRREGULAR BOWEL HABITS: ICD-10-CM

## 2023-01-27 DIAGNOSIS — I10 ESSENTIAL HYPERTENSION: ICD-10-CM

## 2023-01-27 DIAGNOSIS — Z00.00 ROUTINE GENERAL MEDICAL EXAMINATION AT A HEALTH CARE FACILITY: ICD-10-CM

## 2023-01-27 DIAGNOSIS — R10.13 EPIGASTRIC PAIN: ICD-10-CM

## 2023-01-27 DIAGNOSIS — Z00.00 ROUTINE HEALTH MAINTENANCE: ICD-10-CM

## 2023-01-27 LAB
ALBUMIN SERPL-MCNC: 4.2 G/DL (ref 3.4–5)
ALBUMIN/GLOB SERPL: 1.2 {RATIO} (ref 1–2)
ALP LIVER SERPL-CCNC: 48 U/L
ALT SERPL-CCNC: 46 U/L
ANION GAP SERPL CALC-SCNC: 7 MMOL/L (ref 0–18)
AST SERPL-CCNC: 25 U/L (ref 15–37)
BASOPHILS # BLD AUTO: 0.06 X10(3) UL (ref 0–0.2)
BASOPHILS NFR BLD AUTO: 0.8 %
BILIRUB SERPL-MCNC: 0.6 MG/DL (ref 0.1–2)
BUN BLD-MCNC: 11 MG/DL (ref 7–18)
CALCIUM BLD-MCNC: 9.5 MG/DL (ref 8.5–10.1)
CHLORIDE SERPL-SCNC: 105 MMOL/L (ref 98–112)
CHOLEST SERPL-MCNC: 159 MG/DL (ref ?–200)
CO2 SERPL-SCNC: 27 MMOL/L (ref 21–32)
CREAT BLD-MCNC: 0.85 MG/DL
EOSINOPHIL # BLD AUTO: 0.05 X10(3) UL (ref 0–0.7)
EOSINOPHIL NFR BLD AUTO: 0.7 %
ERYTHROCYTE [DISTWIDTH] IN BLOOD BY AUTOMATED COUNT: 13.2 %
FASTING PATIENT LIPID ANSWER: YES
FASTING STATUS PATIENT QL REPORTED: YES
GFR SERPLBLD BASED ON 1.73 SQ M-ARVRAT: 118 ML/MIN/1.73M2 (ref 60–?)
GLOBULIN PLAS-MCNC: 3.5 G/DL (ref 2.8–4.4)
GLUCOSE BLD-MCNC: 100 MG/DL (ref 70–99)
HCT VFR BLD AUTO: 46.2 %
HDLC SERPL-MCNC: 43 MG/DL (ref 40–59)
HGB BLD-MCNC: 15 G/DL
IGA SERPL-MCNC: 130 MG/DL (ref 70–312)
IMM GRANULOCYTES # BLD AUTO: 0.03 X10(3) UL (ref 0–1)
IMM GRANULOCYTES NFR BLD: 0.4 %
LDLC SERPL CALC-MCNC: 95 MG/DL (ref ?–100)
LYMPHOCYTES # BLD AUTO: 2.13 X10(3) UL (ref 1–4)
LYMPHOCYTES NFR BLD AUTO: 28.2 %
MCH RBC QN AUTO: 28.4 PG (ref 26–34)
MCHC RBC AUTO-ENTMCNC: 32.5 G/DL (ref 31–37)
MCV RBC AUTO: 87.3 FL
MONOCYTES # BLD AUTO: 0.62 X10(3) UL (ref 0.1–1)
MONOCYTES NFR BLD AUTO: 8.2 %
NEUTROPHILS # BLD AUTO: 4.66 X10 (3) UL (ref 1.5–7.7)
NEUTROPHILS # BLD AUTO: 4.66 X10(3) UL (ref 1.5–7.7)
NEUTROPHILS NFR BLD AUTO: 61.7 %
NONHDLC SERPL-MCNC: 116 MG/DL (ref ?–130)
OSMOLALITY SERPL CALC.SUM OF ELEC: 287 MOSM/KG (ref 275–295)
PLATELET # BLD AUTO: 234 10(3)UL (ref 150–450)
POTASSIUM SERPL-SCNC: 4.6 MMOL/L (ref 3.5–5.1)
PROT SERPL-MCNC: 7.7 G/DL (ref 6.4–8.2)
RBC # BLD AUTO: 5.29 X10(6)UL
SODIUM SERPL-SCNC: 139 MMOL/L (ref 136–145)
TRIGL SERPL-MCNC: 114 MG/DL (ref 30–149)
TSI SER-ACNC: 0.81 MIU/ML (ref 0.36–3.74)
VLDLC SERPL CALC-MCNC: 19 MG/DL (ref 0–30)
WBC # BLD AUTO: 7.6 X10(3) UL (ref 4–11)

## 2023-01-27 PROCEDURE — 80061 LIPID PANEL: CPT | Performed by: FAMILY MEDICINE

## 2023-01-27 PROCEDURE — 80050 GENERAL HEALTH PANEL: CPT | Performed by: FAMILY MEDICINE

## 2023-01-31 ENCOUNTER — OFFICE VISIT (OUTPATIENT)
Dept: FAMILY MEDICINE CLINIC | Facility: CLINIC | Age: 33
End: 2023-01-31
Payer: COMMERCIAL

## 2023-01-31 ENCOUNTER — LAB ENCOUNTER (OUTPATIENT)
Dept: LAB | Facility: HOSPITAL | Age: 33
End: 2023-01-31
Attending: INTERNAL MEDICINE
Payer: COMMERCIAL

## 2023-01-31 VITALS
BODY MASS INDEX: 42.66 KG/M2 | HEIGHT: 72 IN | TEMPERATURE: 98 F | SYSTOLIC BLOOD PRESSURE: 118 MMHG | HEART RATE: 80 BPM | WEIGHT: 315 LBS | DIASTOLIC BLOOD PRESSURE: 74 MMHG | RESPIRATION RATE: 16 BRPM | OXYGEN SATURATION: 98 %

## 2023-01-31 DIAGNOSIS — Z01.812 ENCOUNTER FOR PREPROCEDURE SCREENING LABORATORY TESTING FOR COVID-19: ICD-10-CM

## 2023-01-31 DIAGNOSIS — Z00.00 ROUTINE HEALTH MAINTENANCE: Primary | ICD-10-CM

## 2023-01-31 DIAGNOSIS — M72.2 PLANTAR FASCIITIS, BILATERAL: ICD-10-CM

## 2023-01-31 DIAGNOSIS — I10 ESSENTIAL HYPERTENSION: ICD-10-CM

## 2023-01-31 DIAGNOSIS — Z20.822 ENCOUNTER FOR PREPROCEDURE SCREENING LABORATORY TESTING FOR COVID-19: ICD-10-CM

## 2023-01-31 DIAGNOSIS — R73.01 ELEVATED FASTING GLUCOSE: ICD-10-CM

## 2023-01-31 DIAGNOSIS — R40.0 DAYTIME SOMNOLENCE: ICD-10-CM

## 2023-01-31 LAB
EST. AVERAGE GLUCOSE BLD GHB EST-MCNC: 117 MG/DL (ref 68–126)
HBA1C MFR BLD: 5.7 % (ref ?–5.7)
TTG IGA SER-ACNC: 0.2 U/ML (ref ?–7)

## 2023-01-31 PROCEDURE — 99395 PREV VISIT EST AGE 18-39: CPT | Performed by: FAMILY MEDICINE

## 2023-01-31 PROCEDURE — 3074F SYST BP LT 130 MM HG: CPT | Performed by: FAMILY MEDICINE

## 2023-01-31 PROCEDURE — 3008F BODY MASS INDEX DOCD: CPT | Performed by: FAMILY MEDICINE

## 2023-01-31 PROCEDURE — 3078F DIAST BP <80 MM HG: CPT | Performed by: FAMILY MEDICINE

## 2023-01-31 RX ORDER — VALSARTAN 160 MG/1
160 TABLET ORAL DAILY
Qty: 90 TABLET | Refills: 1 | Status: SHIPPED | OUTPATIENT
Start: 2023-01-31

## 2023-01-31 RX ORDER — AMLODIPINE BESYLATE 10 MG/1
10 TABLET ORAL DAILY
Qty: 90 TABLET | Refills: 1 | Status: SHIPPED | OUTPATIENT
Start: 2023-01-31

## 2023-01-31 RX ORDER — PHENOL 1.4 %
1 AEROSOL, SPRAY (ML) MUCOUS MEMBRANE NIGHTLY PRN
COMMUNITY

## 2023-02-01 LAB — SARS-COV-2 RNA RESP QL NAA+PROBE: NOT DETECTED

## 2023-02-03 ENCOUNTER — ANESTHESIA (OUTPATIENT)
Dept: ENDOSCOPY | Facility: HOSPITAL | Age: 33
End: 2023-02-03
Payer: COMMERCIAL

## 2023-02-03 ENCOUNTER — HOSPITAL ENCOUNTER (OUTPATIENT)
Facility: HOSPITAL | Age: 33
Setting detail: HOSPITAL OUTPATIENT SURGERY
Discharge: HOME | End: 2023-02-03
Attending: INTERNAL MEDICINE | Admitting: INTERNAL MEDICINE
Payer: COMMERCIAL

## 2023-02-03 ENCOUNTER — ANESTHESIA EVENT (OUTPATIENT)
Dept: ENDOSCOPY | Facility: HOSPITAL | Age: 33
End: 2023-02-03
Payer: COMMERCIAL

## 2023-02-03 VITALS
HEART RATE: 79 BPM | HEIGHT: 72 IN | BODY MASS INDEX: 42.66 KG/M2 | SYSTOLIC BLOOD PRESSURE: 128 MMHG | WEIGHT: 315 LBS | DIASTOLIC BLOOD PRESSURE: 83 MMHG | TEMPERATURE: 98 F | RESPIRATION RATE: 18 BRPM | OXYGEN SATURATION: 96 %

## 2023-02-03 DIAGNOSIS — Z20.822 ENCOUNTER FOR PREPROCEDURE SCREENING LABORATORY TESTING FOR COVID-19: Primary | ICD-10-CM

## 2023-02-03 DIAGNOSIS — Z01.812 ENCOUNTER FOR PREPROCEDURE SCREENING LABORATORY TESTING FOR COVID-19: Primary | ICD-10-CM

## 2023-02-03 DIAGNOSIS — R10.13 EPIGASTRIC PAIN: ICD-10-CM

## 2023-02-03 PROCEDURE — 88305 TISSUE EXAM BY PATHOLOGIST: CPT | Performed by: INTERNAL MEDICINE

## 2023-02-03 PROCEDURE — 0DB68ZX EXCISION OF STOMACH, VIA NATURAL OR ARTIFICIAL OPENING ENDOSCOPIC, DIAGNOSTIC: ICD-10-PCS | Performed by: INTERNAL MEDICINE

## 2023-02-03 PROCEDURE — 0DB98ZX EXCISION OF DUODENUM, VIA NATURAL OR ARTIFICIAL OPENING ENDOSCOPIC, DIAGNOSTIC: ICD-10-PCS | Performed by: INTERNAL MEDICINE

## 2023-02-03 RX ORDER — LIDOCAINE HYDROCHLORIDE 20 MG/ML
INJECTION, SOLUTION EPIDURAL; INFILTRATION; INTRACAUDAL; PERINEURAL AS NEEDED
Status: DISCONTINUED | OUTPATIENT
Start: 2023-02-03 | End: 2023-02-03 | Stop reason: SURG

## 2023-02-03 RX ORDER — SODIUM CHLORIDE, SODIUM LACTATE, POTASSIUM CHLORIDE, CALCIUM CHLORIDE 600; 310; 30; 20 MG/100ML; MG/100ML; MG/100ML; MG/100ML
INJECTION, SOLUTION INTRAVENOUS CONTINUOUS
Status: DISCONTINUED | OUTPATIENT
Start: 2023-02-03 | End: 2023-02-03

## 2023-02-03 RX ADMIN — LIDOCAINE HYDROCHLORIDE 150 MG: 20 INJECTION, SOLUTION EPIDURAL; INFILTRATION; INTRACAUDAL; PERINEURAL at 09:42:00

## 2023-02-03 NOTE — ANESTHESIA POSTPROCEDURE EVALUATION
[place] [address] Patient Status:  Hospital Outpatient Surgery   Age/Gender 34year old male MRN [id number]   Location [address] [address] Attending Lina Coleman DO   Hosp Day # 0 PCP Kinsey Jorgensen DO       Anesthesia Post-op Note    ESOPHAGOGASTRODUODENOSCOPY (EGD) w/ biopsies    Procedure Summary     Date: 02/03/23 Room / Location: [address] [place] [address] ENDOSCOPY 02 / [address] [facility] ENDOSCOPY    Anesthesia Start: 6394 Anesthesia Stop: 5426    Procedure: ESOPHAGOGASTRODUODENOSCOPY (EGD) w/ biopsies Diagnosis:       Epigastric pain      (Duodenal polyp, Gastritis)    Surgeons: Lina Coleman DO Anesthesiologist: Jeannie Barakat MD    Anesthesia Type: MAC ASA Status: 2          Anesthesia Type: No value filed. Vitals Value Taken Time   /81 02/03/23 0953   Temp   02/03/23 0953   Pulse 87 02/03/23 0953   Resp 14 02/03/23 0953   SpO2 96 02/03/23 0953       Patient Location: Endoscopy    Anesthesia Type: MAC    Airway Patency: patent    Postop Pain Control: adequate    Mental Status: mildly sedated but able to meaningfully participate in the post-anesthesia evaluation    Nausea/Vomiting: none    Cardiopulmonary/Hydration status: stable euvolemic    Complications: no apparent anesthesia related complications    Postop vital signs: stable    Dental Exam: Unchanged from Preop    Patient to be discharged home when criteria met.

## 2023-02-03 NOTE — DISCHARGE INSTRUCTIONS
Home Care Instructions for Colonoscopy with Sedation    Diet:  - Resume your regular diet as tolerated unless otherwise instructed. - Start with light meals to minimize bloating.  - Do not drink alcohol today. Medication:  - If you have questions about resuming your normal medications, please contact your Primary Care Physician. Activities:  - Take it easy today. Do not return to work today. - Do not drive today. - Do not operate any machinery today (including kitchen equipment). Gastroscopy:  - You may have a sore throat for 2-3 days following the exam. This is normal. Gargling with warm salt water (1/2 tsp salt to 1 glass warm water) or using throat lozenges will help. - If you experience any sharp pain in your neck, abdomen or chest, vomiting of blood, oral temperature over 100 degrees Fahrenheit, light-headedness or dizziness, or any other problems, contact your doctor. **If unable to reach your doctor, please go to the [facility] Emergency Room**    - Your referring physician will receive a full report of your examination.  - If you do not hear from your doctor's office within two weeks of your biopsy, please call them for your results. You may be able to see your laboratory results in "[employer] between 4 and 7 business days. In some cases, your physician may not have viewed the results before they are released to [address] If you have questions regarding your results contact the physician who ordered the test/exam by phone or via [address] by choosing \"Ask a Medical Question. \"

## 2023-06-14 ENCOUNTER — OFFICE VISIT (OUTPATIENT)
Dept: FAMILY MEDICINE CLINIC | Facility: CLINIC | Age: 33
End: 2023-06-14
Payer: COMMERCIAL

## 2023-06-14 VITALS
TEMPERATURE: 99 F | DIASTOLIC BLOOD PRESSURE: 82 MMHG | SYSTOLIC BLOOD PRESSURE: 128 MMHG | BODY MASS INDEX: 42.66 KG/M2 | WEIGHT: 315 LBS | HEART RATE: 106 BPM | OXYGEN SATURATION: 96 % | HEIGHT: 72 IN | RESPIRATION RATE: 16 BRPM

## 2023-06-14 DIAGNOSIS — J02.9 SORE THROAT: Primary | ICD-10-CM

## 2023-06-14 LAB
CONTROL LINE PRESENT WITH A CLEAR BACKGROUND (YES/NO): YES YES/NO
KIT LOT #: NORMAL NUMERIC

## 2023-06-14 PROCEDURE — 3008F BODY MASS INDEX DOCD: CPT | Performed by: FAMILY MEDICINE

## 2023-06-14 PROCEDURE — 87635 SARS-COV-2 COVID-19 AMP PRB: CPT | Performed by: FAMILY MEDICINE

## 2023-06-14 PROCEDURE — 87880 STREP A ASSAY W/OPTIC: CPT | Performed by: FAMILY MEDICINE

## 2023-06-14 PROCEDURE — 3079F DIAST BP 80-89 MM HG: CPT | Performed by: FAMILY MEDICINE

## 2023-06-14 PROCEDURE — 3074F SYST BP LT 130 MM HG: CPT | Performed by: FAMILY MEDICINE

## 2023-06-14 PROCEDURE — 99213 OFFICE O/P EST LOW 20 MIN: CPT | Performed by: FAMILY MEDICINE

## 2023-06-14 RX ORDER — PANTOPRAZOLE SODIUM 20 MG/1
TABLET, DELAYED RELEASE ORAL
COMMUNITY
Start: 2023-04-22

## 2023-06-14 NOTE — PATIENT INSTRUCTIONS
Your testing will be back in 24-36 hours. Use OTC meds for comfort as needed--  Ibuprofen/Tylenol for fever/pain  Use Benadryl at bedtime to reduce drainage and promote rest.  Zyrtec/Claritin/Allegra in the AM to reduce nasal drainage without sedation. Use saline nasal sprays to reduce congestion and thin secretions. Use Delsym for cough. Consider applying lisa's vapo-rub or eucayptus oil to chest and feet at bedtime to reduce chest and nasal congestion. Warm tea with honey, cough lozenges, vaporizers/steam etc.    If no better in 2-3 days, follow-up with your PCP for further evaluation.

## 2023-06-15 LAB — SARS-COV-2 RNA RESP QL NAA+PROBE: NOT DETECTED

## 2023-09-29 ENCOUNTER — IMMUNIZATION (OUTPATIENT)
Dept: FAMILY MEDICINE CLINIC | Facility: CLINIC | Age: 33
End: 2023-09-29
Payer: COMMERCIAL

## 2023-09-29 DIAGNOSIS — Z23 NEED FOR VACCINATION: Primary | ICD-10-CM

## 2023-09-29 PROCEDURE — 90471 IMMUNIZATION ADMIN: CPT | Performed by: FAMILY MEDICINE

## 2023-09-29 PROCEDURE — 90686 IIV4 VACC NO PRSV 0.5 ML IM: CPT | Performed by: FAMILY MEDICINE

## 2023-10-15 DIAGNOSIS — I10 ESSENTIAL HYPERTENSION: ICD-10-CM

## 2023-10-17 RX ORDER — PANTOPRAZOLE SODIUM 20 MG/1
20 TABLET, DELAYED RELEASE ORAL
Qty: 90 TABLET | Refills: 0 | Status: SHIPPED | OUTPATIENT
Start: 2023-10-17

## 2023-10-17 RX ORDER — AMLODIPINE BESYLATE 10 MG/1
10 TABLET ORAL DAILY
Qty: 90 TABLET | Refills: 1 | Status: SHIPPED | OUTPATIENT
Start: 2023-10-17

## 2023-10-17 NOTE — TELEPHONE ENCOUNTER
Refill request for:    Requested Prescriptions     Pending Prescriptions Disp Refills    amLODIPine 10 MG Oral Tab 90 tablet 1     Sig: Take 1 tablet (10 mg total) by mouth daily. pantoprazole 20 MG Oral Tab EC  0        Last Prescribed Quantity Refills   04/22/23       LOV 1/31/2023     Patient was asked to follow-up in: 6 months    Appointment due: July 2023    Appointment scheduled: Visit date not found    Medication not on protocol. Amlodipine failed protocol, pt due for appt.     [facility]   Routed to  to schedule RTC

## 2024-04-28 DIAGNOSIS — I10 ESSENTIAL HYPERTENSION: ICD-10-CM

## 2024-04-30 ENCOUNTER — TELEPHONE (OUTPATIENT)
Dept: FAMILY MEDICINE CLINIC | Facility: CLINIC | Age: 34
End: 2024-04-30

## 2024-04-30 DIAGNOSIS — I10 ESSENTIAL HYPERTENSION: ICD-10-CM

## 2024-04-30 RX ORDER — VALSARTAN 160 MG/1
160 TABLET ORAL DAILY
Qty: 90 TABLET | Refills: 0 | Status: SHIPPED | OUTPATIENT
Start: 2024-04-30

## 2024-04-30 RX ORDER — AMLODIPINE BESYLATE 10 MG/1
10 TABLET ORAL DAILY
Qty: 90 TABLET | Refills: 0 | Status: SHIPPED | OUTPATIENT
Start: 2024-04-30

## 2024-04-30 NOTE — TELEPHONE ENCOUNTER
Patient called that he is out of his valsartan and he is low on amLodipine. He does have a physical appt in July with Dr. Austin and schedule this Friday with Iglesia Wong.           amLODIPine 10 MG Oral Tab 10 tablet   valsartan 160 MG Oral Tab     [facility] [address] [place], [place] [address] FELIX Medrano [phone]

## 2024-05-01 RX ORDER — VALSARTAN 160 MG/1
160 TABLET ORAL DAILY
Qty: 10 TABLET | Refills: 0 | Status: SHIPPED | OUTPATIENT
Start: 2024-05-01

## 2024-05-01 RX ORDER — AMLODIPINE BESYLATE 10 MG/1
10 TABLET ORAL DAILY
Qty: 10 TABLET | Refills: 0 | Status: SHIPPED | OUTPATIENT
Start: 2024-05-01

## 2024-05-03 ENCOUNTER — PATIENT MESSAGE (OUTPATIENT)
Dept: FAMILY MEDICINE CLINIC | Facility: CLINIC | Age: 34
End: 2024-05-03

## 2024-05-03 ENCOUNTER — OFFICE VISIT (OUTPATIENT)
Dept: FAMILY MEDICINE CLINIC | Facility: CLINIC | Age: 34
End: 2024-05-03
Payer: COMMERCIAL

## 2024-05-03 VITALS
BODY MASS INDEX: 49 KG/M2 | DIASTOLIC BLOOD PRESSURE: 84 MMHG | WEIGHT: 315 LBS | SYSTOLIC BLOOD PRESSURE: 128 MMHG | OXYGEN SATURATION: 98 % | RESPIRATION RATE: 16 BRPM | HEART RATE: 90 BPM | TEMPERATURE: 97 F

## 2024-05-03 DIAGNOSIS — R40.0 DAYTIME SOMNOLENCE: ICD-10-CM

## 2024-05-03 DIAGNOSIS — R53.83 FATIGUE, UNSPECIFIED TYPE: Primary | ICD-10-CM

## 2024-05-03 DIAGNOSIS — R06.83 SNORES: Primary | ICD-10-CM

## 2024-05-03 PROCEDURE — 99214 OFFICE O/P EST MOD 30 MIN: CPT | Performed by: NURSE PRACTITIONER

## 2024-05-03 NOTE — PATIENT INSTRUCTIONS
Please complete blood work as ordered. Do blood work fasting- no food or drink except for plain water- for 8 hours prior to testing. Ideally, labs would be done early in the morning.   You can call [phone] to schedule testing or it can be scheduled via the [employer] torsten.
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
## 2024-05-04 NOTE — TELEPHONE ENCOUNTER
From: Renzo Stevens  To: Iglesia Wong  Sent: 5/3/2024 6:52 PM CDT  Subject: Deviated septum    Been reading about sleep apnea all day — one thing I wasn’t thinking about at the time was a deviated septum. My nose has always had one nostril feel smaller than the other (always clogs first, etc) but I’ve never done anything about it. Petal looking into in addition to sleep study?

## 2024-05-13 ENCOUNTER — PATIENT MESSAGE (OUTPATIENT)
Dept: FAMILY MEDICINE CLINIC | Facility: CLINIC | Age: 34
End: 2024-05-13

## 2024-05-13 NOTE — TELEPHONE ENCOUNTER
From: Renzo Stevens  To: Iglesia Wong  Sent: 5/13/2024 10:22 AM CDT  Subject: [facility] Sleep Test Results    Sammy Parekh,    Given that it will be a while before I get to do the sleep test with [facility], I went ahead and tried [facility] (my workplace has a voucher program with them). I’m attaching their report from the in-home study they did and am curious your opinion — is this enough to skip the [facility] sleep test or should I still keep that appointment? In any case I think this explains quite succinctly why I feel so poorly these days…

## 2024-06-18 ENCOUNTER — OFFICE VISIT (OUTPATIENT)
Facility: LOCATION | Age: 34
End: 2024-06-18

## 2024-06-18 DIAGNOSIS — J35.3 TONSILLAR AND ADENOID HYPERTROPHY: ICD-10-CM

## 2024-06-18 DIAGNOSIS — J34.2 DEVIATED SEPTUM: ICD-10-CM

## 2024-06-18 DIAGNOSIS — G47.33 OBSTRUCTIVE SLEEP APNEA SYNDROME: Primary | ICD-10-CM

## 2024-06-18 PROCEDURE — 31575 DIAGNOSTIC LARYNGOSCOPY: CPT | Performed by: OTOLARYNGOLOGY

## 2024-06-18 PROCEDURE — 99204 OFFICE O/P NEW MOD 45 MIN: CPT | Performed by: OTOLARYNGOLOGY
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
## 2024-06-19 ENCOUNTER — TELEPHONE (OUTPATIENT)
Facility: LOCATION | Age: 34
End: 2024-06-19

## 2024-06-19 DIAGNOSIS — J34.3 HYPERTROPHY OF NASAL TURBINATES: ICD-10-CM

## 2024-06-19 DIAGNOSIS — J35.01 CHRONIC TONSILLITIS: ICD-10-CM

## 2024-06-19 DIAGNOSIS — J34.2 DEVIATED NASAL SEPTUM: Primary | ICD-10-CM

## 2024-06-19 DIAGNOSIS — I10 HYPERTENSION, UNSPECIFIED TYPE: Primary | ICD-10-CM

## 2024-07-08 ENCOUNTER — TELEPHONE (OUTPATIENT)
Dept: FAMILY MEDICINE CLINIC | Facility: CLINIC | Age: 34
End: 2024-07-08

## 2024-07-08 DIAGNOSIS — Z01.818 PRE-OP TESTING: Primary | ICD-10-CM

## 2024-07-08 NOTE — TELEPHONE ENCOUNTER
Called patient and told him of need to get EKG and lab work done before the appointment he will get this done before seeing Dr Austin

## 2024-07-08 NOTE — TELEPHONE ENCOUNTER
Received pre op paperwork from [facility] for patient having surgery on 7/25/24 with Dr. Todd Mendiola for Nasal Septoplasty    Orders needed:    EKG within 90 days  BMP within 90 days  H&P  Medical Clearance    Appt: Dr. Austin on 7/15/24 at 1:20 pm.    Paperwork in triage for review.

## 2024-07-13 ENCOUNTER — LAB ENCOUNTER (OUTPATIENT)
Dept: LAB | Age: 34
End: 2024-07-13
Attending: FAMILY MEDICINE
Payer: COMMERCIAL

## 2024-07-13 ENCOUNTER — EKG ENCOUNTER (OUTPATIENT)
Dept: LAB | Age: 34
End: 2024-07-13
Attending: OTOLARYNGOLOGY
Payer: COMMERCIAL

## 2024-07-13 DIAGNOSIS — R53.83 FATIGUE, UNSPECIFIED TYPE: ICD-10-CM

## 2024-07-13 DIAGNOSIS — Z01.818 PRE-OP TESTING: ICD-10-CM

## 2024-07-13 DIAGNOSIS — I10 HTN (HYPERTENSION): Primary | ICD-10-CM

## 2024-07-13 LAB
ALBUMIN SERPL-MCNC: 3.9 G/DL (ref 3.4–5)
ALBUMIN/GLOB SERPL: 1.2 {RATIO} (ref 1–2)
ALP LIVER SERPL-CCNC: 49 U/L
ALT SERPL-CCNC: 73 U/L
ANION GAP SERPL CALC-SCNC: 10 MMOL/L (ref 0–18)
AST SERPL-CCNC: 30 U/L (ref 15–37)
BASOPHILS # BLD AUTO: 0.04 X10(3) UL (ref 0–0.2)
BASOPHILS NFR BLD AUTO: 0.6 %
BILIRUB SERPL-MCNC: 0.3 MG/DL (ref 0.1–2)
BUN BLD-MCNC: 11 MG/DL (ref 9–23)
CALCIUM BLD-MCNC: 9 MG/DL (ref 8.5–10.1)
CHLORIDE SERPL-SCNC: 109 MMOL/L (ref 98–112)
CO2 SERPL-SCNC: 23 MMOL/L (ref 21–32)
CREAT BLD-MCNC: 0.72 MG/DL
EGFRCR SERPLBLD CKD-EPI 2021: 122 ML/MIN/1.73M2 (ref 60–?)
EOSINOPHIL # BLD AUTO: 0.08 X10(3) UL (ref 0–0.7)
EOSINOPHIL NFR BLD AUTO: 1.2 %
ERYTHROCYTE [DISTWIDTH] IN BLOOD BY AUTOMATED COUNT: 13.2 %
FASTING STATUS PATIENT QL REPORTED: YES
GLOBULIN PLAS-MCNC: 3.3 G/DL (ref 2.8–4.4)
GLUCOSE BLD-MCNC: 109 MG/DL (ref 70–99)
HCT VFR BLD AUTO: 40.6 %
HGB BLD-MCNC: 13.1 G/DL
IMM GRANULOCYTES # BLD AUTO: 0.03 X10(3) UL (ref 0–1)
IMM GRANULOCYTES NFR BLD: 0.5 %
LYMPHOCYTES # BLD AUTO: 2.37 X10(3) UL (ref 1–4)
LYMPHOCYTES NFR BLD AUTO: 35.8 %
MCH RBC QN AUTO: 28.5 PG (ref 26–34)
MCHC RBC AUTO-ENTMCNC: 32.3 G/DL (ref 31–37)
MCV RBC AUTO: 88.3 FL
MONOCYTES # BLD AUTO: 0.46 X10(3) UL (ref 0.1–1)
MONOCYTES NFR BLD AUTO: 6.9 %
NEUTROPHILS # BLD AUTO: 3.64 X10 (3) UL (ref 1.5–7.7)
NEUTROPHILS # BLD AUTO: 3.64 X10(3) UL (ref 1.5–7.7)
NEUTROPHILS NFR BLD AUTO: 55 %
OSMOLALITY SERPL CALC.SUM OF ELEC: 294 MOSM/KG (ref 275–295)
PLATELET # BLD AUTO: 187 10(3)UL (ref 150–450)
POTASSIUM SERPL-SCNC: 3.8 MMOL/L (ref 3.5–5.1)
PROT SERPL-MCNC: 7.2 G/DL (ref 6.4–8.2)
RBC # BLD AUTO: 4.6 X10(6)UL
SODIUM SERPL-SCNC: 142 MMOL/L (ref 136–145)
TESTOST SERPL-MCNC: 184.12 NG/DL
TSI SER-ACNC: 1.12 MIU/ML (ref 0.36–3.74)
VIT B12 SERPL-MCNC: 478 PG/ML (ref 193–986)
WBC # BLD AUTO: 6.6 X10(3) UL (ref 4–11)

## 2024-07-13 PROCEDURE — 82607 VITAMIN B-12: CPT

## 2024-07-13 PROCEDURE — 36415 COLL VENOUS BLD VENIPUNCTURE: CPT

## 2024-07-13 PROCEDURE — 80053 COMPREHEN METABOLIC PANEL: CPT

## 2024-07-13 PROCEDURE — 84443 ASSAY THYROID STIM HORMONE: CPT

## 2024-07-13 PROCEDURE — 84403 ASSAY OF TOTAL TESTOSTERONE: CPT

## 2024-07-13 PROCEDURE — 85025 COMPLETE CBC W/AUTO DIFF WBC: CPT

## 2024-07-15 ENCOUNTER — OFFICE VISIT (OUTPATIENT)
Dept: FAMILY MEDICINE CLINIC | Facility: CLINIC | Age: 34
End: 2024-07-15
Payer: COMMERCIAL

## 2024-07-15 VITALS
DIASTOLIC BLOOD PRESSURE: 64 MMHG | HEIGHT: 71 IN | RESPIRATION RATE: 18 BRPM | WEIGHT: 315 LBS | SYSTOLIC BLOOD PRESSURE: 148 MMHG | BODY MASS INDEX: 44.1 KG/M2 | HEART RATE: 100 BPM

## 2024-07-15 DIAGNOSIS — R94.31 ABNORMAL EKG: ICD-10-CM

## 2024-07-15 DIAGNOSIS — Z01.818 PREOP EXAMINATION: Primary | ICD-10-CM
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
## 2024-07-18 ENCOUNTER — ANESTHESIA EVENT (OUTPATIENT)
Dept: SURGERY | Facility: HOSPITAL | Age: 34
End: 2024-07-18
Payer: COMMERCIAL

## 2024-07-23 ENCOUNTER — HOSPITAL ENCOUNTER (OUTPATIENT)
Dept: CV DIAGNOSTICS | Facility: HOSPITAL | Age: 34
Discharge: HOME | End: 2024-07-23
Attending: FAMILY MEDICINE
Payer: COMMERCIAL

## 2024-07-23 DIAGNOSIS — R94.31 ABNORMAL EKG: ICD-10-CM

## 2024-07-23 DIAGNOSIS — Z01.818 PREOP EXAMINATION: ICD-10-CM

## 2024-07-23 PROCEDURE — 78452 HT MUSCLE IMAGE SPECT MULT: CPT | Performed by: FAMILY MEDICINE

## 2024-07-23 PROCEDURE — 93018 CV STRESS TEST I&R ONLY: CPT | Performed by: FAMILY MEDICINE

## 2024-07-23 PROCEDURE — 93017 CV STRESS TEST TRACING ONLY: CPT | Performed by: FAMILY MEDICINE
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
## 2024-07-24 ENCOUNTER — TELEPHONE (OUTPATIENT)
Dept: FAMILY MEDICINE CLINIC | Facility: CLINIC | Age: 34
End: 2024-07-24

## 2024-07-24 NOTE — TELEPHONE ENCOUNTER
Dr. Leong results stress test and put normal Stress test- cleared for surgery.  Called pre op testing and left message. Also faxed results to pre op testing to [phone]

## 2024-07-24 NOTE — PAT NURSING NOTE
Spoke with Muriel (Dr Austin) to update clearance note pending stress test done 7/23. States she if off today and will be in office tomorrow at 0745 and will have it completed then.

## 2024-07-24 NOTE — TELEPHONE ENCOUNTER
Cele from pre admission testing is calling because pt had a stress test done and they need his pre op h&p addend to say that he is clear for surgery

## 2024-07-25 ENCOUNTER — ANESTHESIA (OUTPATIENT)
Dept: SURGERY | Facility: HOSPITAL | Age: 34
End: 2024-07-25
Payer: COMMERCIAL

## 2024-07-25 ENCOUNTER — HOSPITAL ENCOUNTER (OUTPATIENT)
Facility: HOSPITAL | Age: 34
Discharge: HOME | End: 2024-07-26
Attending: OTOLARYNGOLOGY | Admitting: OTOLARYNGOLOGY
Payer: COMMERCIAL

## 2024-07-25 DIAGNOSIS — J34.2 DEVIATED NASAL SEPTUM: ICD-10-CM

## 2024-07-25 DIAGNOSIS — J34.3 HYPERTROPHY OF NASAL TURBINATES: ICD-10-CM

## 2024-07-25 DIAGNOSIS — J35.01 CHRONIC TONSILLITIS: ICD-10-CM

## 2024-07-25 PROCEDURE — 42826 REMOVAL OF TONSILS: CPT | Performed by: OTOLARYNGOLOGY

## 2024-07-25 PROCEDURE — 30140 RESECT INFERIOR TURBINATE: CPT | Performed by: OTOLARYNGOLOGY

## 2024-07-25 PROCEDURE — 0CBPXZZ EXCISION OF TONSILS, EXTERNAL APPROACH: ICD-10-PCS | Performed by: OTOLARYNGOLOGY

## 2024-07-25 PROCEDURE — 09BM0ZZ EXCISION OF NASAL SEPTUM, OPEN APPROACH: ICD-10-PCS | Performed by: OTOLARYNGOLOGY

## 2024-07-25 PROCEDURE — 30520 REPAIR OF NASAL SEPTUM: CPT | Performed by: OTOLARYNGOLOGY

## 2024-07-25 PROCEDURE — 09SL0ZZ REPOSITION NASAL TURBINATE, OPEN APPROACH: ICD-10-PCS | Performed by: OTOLARYNGOLOGY

## 2024-07-25 RX ORDER — HYDROMORPHONE HYDROCHLORIDE 1 MG/ML
0.4 INJECTION, SOLUTION INTRAMUSCULAR; INTRAVENOUS; SUBCUTANEOUS EVERY 2 HOUR PRN
Status: DISCONTINUED | OUTPATIENT
Start: 2024-07-25 | End: 2024-07-26

## 2024-07-25 RX ORDER — LIDOCAINE HYDROCHLORIDE AND EPINEPHRINE 10; 10 MG/ML; UG/ML
INJECTION, SOLUTION INFILTRATION; PERINEURAL AS NEEDED
Status: DISCONTINUED | OUTPATIENT
Start: 2024-07-25 | End: 2024-07-25 | Stop reason: HOSPADM

## 2024-07-25 RX ORDER — LOSARTAN POTASSIUM 100 MG/1
100 TABLET ORAL DAILY
Status: DISCONTINUED | OUTPATIENT
Start: 2024-07-25 | End: 2024-07-26

## 2024-07-25 RX ORDER — SODIUM CHLORIDE, SODIUM LACTATE, POTASSIUM CHLORIDE, CALCIUM CHLORIDE 600; 310; 30; 20 MG/100ML; MG/100ML; MG/100ML; MG/100ML
INJECTION, SOLUTION INTRAVENOUS CONTINUOUS
Status: DISCONTINUED | OUTPATIENT
Start: 2024-07-25 | End: 2024-07-25 | Stop reason: HOSPADM

## 2024-07-25 RX ORDER — PROCHLORPERAZINE EDISYLATE 5 MG/ML
INJECTION INTRAMUSCULAR; INTRAVENOUS
Status: COMPLETED
Start: 2024-07-25 | End: 2024-07-25

## 2024-07-25 RX ORDER — NALOXONE HYDROCHLORIDE 0.4 MG/ML
0.08 INJECTION, SOLUTION INTRAMUSCULAR; INTRAVENOUS; SUBCUTANEOUS AS NEEDED
Status: DISCONTINUED | OUTPATIENT
Start: 2024-07-25 | End: 2024-07-25 | Stop reason: HOSPADM

## 2024-07-25 RX ORDER — PROCHLORPERAZINE EDISYLATE 5 MG/ML
5 INJECTION INTRAMUSCULAR; INTRAVENOUS EVERY 8 HOURS PRN
Status: DISCONTINUED | OUTPATIENT
Start: 2024-07-25 | End: 2024-07-25 | Stop reason: HOSPADM

## 2024-07-25 RX ORDER — HYDROCODONE BITARTRATE AND ACETAMINOPHEN 5; 325 MG/1; MG/1
2 TABLET ORAL ONCE AS NEEDED
Status: DISCONTINUED | OUTPATIENT
Start: 2024-07-25 | End: 2024-07-25 | Stop reason: HOSPADM

## 2024-07-25 RX ORDER — ACETAMINOPHEN 500 MG
1000 TABLET ORAL ONCE AS NEEDED
Status: DISCONTINUED | OUTPATIENT
Start: 2024-07-25 | End: 2024-07-25 | Stop reason: HOSPADM

## 2024-07-25 RX ORDER — HYDROCODONE BITARTRATE AND ACETAMINOPHEN 5; 325 MG/1; MG/1
2 TABLET ORAL EVERY 4 HOURS PRN
Status: DISCONTINUED | OUTPATIENT
Start: 2024-07-25 | End: 2024-07-26

## 2024-07-25 RX ORDER — DEXAMETHASONE SODIUM PHOSPHATE 4 MG/ML
VIAL (ML) INJECTION AS NEEDED
Status: DISCONTINUED | OUTPATIENT
Start: 2024-07-25 | End: 2024-07-25 | Stop reason: SURG

## 2024-07-25 RX ORDER — HYDROCODONE BITARTRATE AND ACETAMINOPHEN 5; 325 MG/1; MG/1
1 TABLET ORAL EVERY 4 HOURS PRN
Status: DISCONTINUED | OUTPATIENT
Start: 2024-07-25 | End: 2024-07-26

## 2024-07-25 RX ORDER — HYDROCODONE BITARTRATE AND ACETAMINOPHEN 5; 325 MG/1; MG/1
1 TABLET ORAL ONCE AS NEEDED
Status: DISCONTINUED | OUTPATIENT
Start: 2024-07-25 | End: 2024-07-25 | Stop reason: HOSPADM

## 2024-07-25 RX ORDER — SODIUM CHLORIDE 9 MG/ML
INJECTION, SOLUTION INTRAVENOUS CONTINUOUS
Status: DISCONTINUED | OUTPATIENT
Start: 2024-07-25 | End: 2024-07-26

## 2024-07-25 RX ORDER — ACETAMINOPHEN 160 MG/5ML
650 SOLUTION ORAL
Status: DISCONTINUED | OUTPATIENT
Start: 2024-07-25 | End: 2024-07-26

## 2024-07-25 RX ORDER — SODIUM CHLORIDE, SODIUM LACTATE, POTASSIUM CHLORIDE, CALCIUM CHLORIDE 600; 310; 30; 20 MG/100ML; MG/100ML; MG/100ML; MG/100ML
INJECTION, SOLUTION INTRAVENOUS CONTINUOUS
Status: DISCONTINUED | OUTPATIENT
Start: 2024-07-25 | End: 2024-07-25

## 2024-07-25 RX ORDER — ONDANSETRON 2 MG/ML
INJECTION INTRAMUSCULAR; INTRAVENOUS
Status: COMPLETED
Start: 2024-07-25 | End: 2024-07-25

## 2024-07-25 RX ORDER — ROCURONIUM BROMIDE 10 MG/ML
INJECTION, SOLUTION INTRAVENOUS AS NEEDED
Status: DISCONTINUED | OUTPATIENT
Start: 2024-07-25 | End: 2024-07-25 | Stop reason: SURG

## 2024-07-25 RX ORDER — BUPIVACAINE HYDROCHLORIDE AND EPINEPHRINE 2.5; 5 MG/ML; UG/ML
INJECTION, SOLUTION EPIDURAL; INFILTRATION; INTRACAUDAL; PERINEURAL AS NEEDED
Status: DISCONTINUED | OUTPATIENT
Start: 2024-07-25 | End: 2024-07-25 | Stop reason: HOSPADM

## 2024-07-25 RX ORDER — ONDANSETRON 4 MG/1
4 TABLET, FILM COATED ORAL EVERY 6 HOURS PRN
Status: DISCONTINUED | OUTPATIENT
Start: 2024-07-25 | End: 2024-07-26

## 2024-07-25 RX ORDER — AMLODIPINE BESYLATE 5 MG/1
10 TABLET ORAL DAILY
Status: DISCONTINUED | OUTPATIENT
Start: 2024-07-25 | End: 2024-07-26

## 2024-07-25 RX ORDER — AMOXICILLIN AND CLAVULANATE POTASSIUM 400; 57 MG/5ML; MG/5ML
10 POWDER, FOR SUSPENSION ORAL 2 TIMES DAILY
Status: DISCONTINUED | OUTPATIENT
Start: 2024-07-25 | End: 2024-07-26

## 2024-07-25 RX ORDER — HYDROMORPHONE HYDROCHLORIDE 1 MG/ML
0.6 INJECTION, SOLUTION INTRAMUSCULAR; INTRAVENOUS; SUBCUTANEOUS EVERY 5 MIN PRN
Status: DISCONTINUED | OUTPATIENT
Start: 2024-07-25 | End: 2024-07-25 | Stop reason: HOSPADM

## 2024-07-25 RX ORDER — ACETAMINOPHEN 500 MG
1000 TABLET ORAL ONCE
Status: DISCONTINUED | OUTPATIENT
Start: 2024-07-25 | End: 2024-07-25 | Stop reason: HOSPADM

## 2024-07-25 RX ORDER — HYDROMORPHONE HYDROCHLORIDE 1 MG/ML
0.2 INJECTION, SOLUTION INTRAMUSCULAR; INTRAVENOUS; SUBCUTANEOUS EVERY 5 MIN PRN
Status: DISCONTINUED | OUTPATIENT
Start: 2024-07-25 | End: 2024-07-25 | Stop reason: HOSPADM

## 2024-07-25 RX ORDER — HYDROMORPHONE HYDROCHLORIDE 1 MG/ML
0.8 INJECTION, SOLUTION INTRAMUSCULAR; INTRAVENOUS; SUBCUTANEOUS EVERY 2 HOUR PRN
Status: DISCONTINUED | OUTPATIENT
Start: 2024-07-25 | End: 2024-07-26

## 2024-07-25 RX ORDER — HYDROMORPHONE HYDROCHLORIDE 1 MG/ML
0.4 INJECTION, SOLUTION INTRAMUSCULAR; INTRAVENOUS; SUBCUTANEOUS EVERY 5 MIN PRN
Status: DISCONTINUED | OUTPATIENT
Start: 2024-07-25 | End: 2024-07-25 | Stop reason: HOSPADM

## 2024-07-25 RX ORDER — HYDROMORPHONE HYDROCHLORIDE 1 MG/ML
INJECTION, SOLUTION INTRAMUSCULAR; INTRAVENOUS; SUBCUTANEOUS
Status: COMPLETED
Start: 2024-07-25 | End: 2024-07-25

## 2024-07-25 RX ORDER — CEFAZOLIN SODIUM IN 0.9 % NACL 3 G/100 ML
3 INTRAVENOUS SOLUTION, PIGGYBACK (ML) INTRAVENOUS ONCE
Status: COMPLETED | OUTPATIENT
Start: 2024-07-25 | End: 2024-07-25

## 2024-07-25 RX ORDER — SCOLOPAMINE TRANSDERMAL SYSTEM 1 MG/1
1 PATCH, EXTENDED RELEASE TRANSDERMAL ONCE
Status: DISCONTINUED | OUTPATIENT
Start: 2024-07-25 | End: 2024-07-26

## 2024-07-25 RX ORDER — ONDANSETRON 2 MG/ML
4 INJECTION INTRAMUSCULAR; INTRAVENOUS EVERY 6 HOURS PRN
Status: DISCONTINUED | OUTPATIENT
Start: 2024-07-25 | End: 2024-07-26

## 2024-07-25 RX ORDER — ONDANSETRON 2 MG/ML
INJECTION INTRAMUSCULAR; INTRAVENOUS AS NEEDED
Status: DISCONTINUED | OUTPATIENT
Start: 2024-07-25 | End: 2024-07-25 | Stop reason: SURG

## 2024-07-25 RX ORDER — ONDANSETRON 2 MG/ML
4 INJECTION INTRAMUSCULAR; INTRAVENOUS EVERY 6 HOURS PRN
Status: DISCONTINUED | OUTPATIENT
Start: 2024-07-25 | End: 2024-07-25 | Stop reason: HOSPADM

## 2024-07-25 RX ORDER — ONDANSETRON 4 MG/1
4 TABLET, ORALLY DISINTEGRATING ORAL EVERY 6 HOURS PRN
Status: DISCONTINUED | OUTPATIENT
Start: 2024-07-25 | End: 2024-07-26

## 2024-07-25 RX ORDER — OXYCODONE HYDROCHLORIDE 10 MG/1
10 TABLET ORAL EVERY 4 HOURS PRN
Status: DISCONTINUED | OUTPATIENT
Start: 2024-07-25 | End: 2024-07-26

## 2024-07-25 RX ADMIN — DEXAMETHASONE SODIUM PHOSPHATE 4 MG: 4 MG/ML VIAL (ML) INJECTION at 13:10:00

## 2024-07-25 RX ADMIN — ROCURONIUM BROMIDE 50 MG: 10 INJECTION, SOLUTION INTRAVENOUS at 13:21:00

## 2024-07-25 RX ADMIN — CEFAZOLIN SODIUM IN 0.9 % NACL 3 G: 3 G/100 ML INTRAVENOUS SOLUTION, PIGGYBACK (ML) INTRAVENOUS at 13:02:00

## 2024-07-25 RX ADMIN — ROCURONIUM BROMIDE 50 MG: 10 INJECTION, SOLUTION INTRAVENOUS at 12:54:00

## 2024-07-25 RX ADMIN — ONDANSETRON 4 MG: 2 INJECTION INTRAMUSCULAR; INTRAVENOUS at 13:45:00

## 2024-07-25 RX ADMIN — SODIUM CHLORIDE, SODIUM LACTATE, POTASSIUM CHLORIDE, CALCIUM CHLORIDE: 600; 310; 30; 20 INJECTION, SOLUTION INTRAVENOUS at 14:05:00

## 2024-07-25 NOTE — ANESTHESIA PROCEDURE NOTES
Airway  Date/Time: 7/25/2024 12:51 PM  Urgency: elective    Airway not difficult    General Information and Staff    Patient location during procedure: OR  Anesthesiologist: Fredis Gonzales DO  Performed: anesthesiologist   Performed by: Fredis Gonzales DO  Authorized by: Fredis Gonzales DO      Indications and Patient Condition  Indications for airway management: anesthesia  Sedation level: deep  Preoxygenated: yes  Patient position: sniffing  Mask difficulty assessment: 1 - vent by mask    Final Airway Details  Final airway type: endotracheal airway      Successful airway: ETT  Cuffed: yes   Successful intubation technique: direct laryngoscopy  Endotracheal tube insertion site: oral  Blade: Maribel  Blade size: #4  ETT size (mm): 7.5    Cormack-Lehane Classification: grade I - full view of glottis  Placement verified by: capnometry   Measured from: lips  ETT to lips (cm): 24  Number of attempts at approach: 1  Number of other approaches attempted: 0

## 2024-07-25 NOTE — PROGRESS NOTES
NURSING ADMISSION NOTE      Patient admitted via  BED from PACU  Oriented to room.  Safety precautions initiated.  Bed in low position.  Call light in reach.

## 2024-07-25 NOTE — ANESTHESIA POSTPROCEDURE EVALUATION
[facility]    Renzo Stevens Patient Status:  Hospital Outpatient Surgery   Age/Gender 34 year old male MRN [id number]   Location [facility] POST ANESTHESIA CARE UNIT Attending Todd Mendiola MD   Hosp Day # 0 PCP Samantha Austin DO       Anesthesia Post-op Note    Nasal Septoplasty; Bilateral Out Fracture of Inferior Turbinates; Bilateral Submucous Resection of Inferior Turbinates    Procedure Summary       Date: 07/25/24 Room / Location:  MAIN OR 02 / [facility] MAIN OR    Anesthesia Start: 1244 Anesthesia Stop: 1405    Procedures:       Nasal Septoplasty; Bilateral Out Fracture of Inferior Turbinates; Bilateral Submucous Resection of Inferior Turbinates (Bilateral: Nose)      Bilateral Tonsillectomy (Bilateral: Mouth) Diagnosis:       Deviated nasal septum      Hypertrophy of nasal turbinates      Chronic tonsillitis      (Deviated nasal septum [J34.2]Hypertrophy of nasal turbinates [J34.3]Chronic tonsillitis [J35.01])    Surgeons: Todd Mendiola MD Anesthesiologist: Fredis Gonzales DO    Anesthesia Type: general ASA Status: 3            Anesthesia Type: general    Vitals Value Taken Time   /74 07/25/24 1403   Temp 97.2 07/25/24 1405   Pulse 100 07/25/24 1405   Resp 17 07/25/24 1405   SpO2 93 % 07/25/24 1405   Vitals shown include unfiled device data.    Patient Location: PACU    Anesthesia Type: general    Airway Patency: patent and extubated    Postop Pain Control: adequate    Mental Status: preanesthetic baseline    Nausea/Vomiting: none    Cardiopulmonary/Hydration status: stable euvolemic    Complications: no apparent anesthesia related complications    Postop vital signs: stable    Dental Exam: Unchanged from Preop    Patient to be discharged from PACU when criteria met.

## 2024-07-25 NOTE — INTERVAL H&P NOTE
Pre-op Diagnosis: Deviated nasal septum [J34.2]  Hypertrophy of nasal turbinates [J34.3]  Chronic tonsillitis [J35.01]    The above referenced H&P was reviewed by Todd Mendiola MD on 7/25/2024, the patient was examined and no significant changes have occurred in the patient's condition since the H&P was performed.  I discussed with the patient and/or legal representative the potential benefits, risks and side effects of this procedure; the likelihood of the patient achieving goals; and potential problems that might occur during recuperation.  I discussed reasonable alternatives to the procedure, including risks, benefits and side effects related to the alternatives and risks related to not receiving this procedure.  We will proceed with procedure as planned.

## 2024-07-25 NOTE — BRIEF OP NOTE
Pre-Operative Diagnosis: Deviated nasal septum [J34.2]  Hypertrophy of nasal turbinates [J34.3]  Chronic tonsillitis [J35.01]     Post-Operative Diagnosis: Deviated nasal septum [J34.2]Hypertrophy of nasal turbinates [J34.3]Chronic tonsillitis [J35.01]      Procedure Performed:   Nasal Septoplasty; Bilateral Out Fracture of Inferior Turbinates; Bilateral Submucous Resection of Inferior Turbinates    Surgeons and Role:     * Todd Mendiola MD - Primary    Assistant(s):        Surgical Findings:      Specimen:      Estimated Blood Loss: Blood Output: 20 mL (7/25/2024  1:47 PM)      Dictation Number:      Todd Mendiola MD  7/25/2024  1:55 PM

## 2024-07-25 NOTE — ANESTHESIA PREPROCEDURE EVALUATION
PRE-OP EVALUATION    Patient Name: Renzo Stevens    Admit Diagnosis: Deviated nasal septum [J34.2]  Hypertrophy of nasal turbinates [J34.3]  Chronic tonsillitis [J35.01]    Pre-op Diagnosis: Deviated nasal septum [J34.2]  Hypertrophy of nasal turbinates [J34.3]  Chronic tonsillitis [J35.01]    Nasal Septoplasty; Bilateral Out Fracture of Inferior Turbinates; Bilateral Submucous Resection of Inferior Turbinates; Bilateral Tonsillectomy    Anesthesia Procedure: Nasal Septoplasty; Bilateral Out Fracture of Inferior Turbinates; Bilateral Submucous Resection of Inferior Turbinates; Bilateral Tonsillectomy (Bilateral)  . (Bilateral)    Surgeons and Role:     * Todd Mendiola MD - Primary    Pre-op vitals reviewed.  Temp: 97.2 °F (36.2 °C)  Pulse: 82  Resp: 16  BP: 161/88  SpO2: 99 %  Body mass index is 48.5 kg/m².    Current medications reviewed.  Hospital Medications:   [Transfer Hold] acetaminophen (Tylenol Extra Strength) tab 1,000 mg  1,000 mg Oral Once    scopolamine (Transderm-Scop) 1 MG/3DAYS patch 1 patch  1 patch Transdermal Once    lactated ringers infusion   Intravenous Continuous    ceFAZolin (Ancef) 3 g in sodium chloride 0.9% 100mL IVPB premix  3 g Intravenous Once       Outpatient Medications:     Medications Prior to Admission   Medication Sig Dispense Refill Last Dose    amLODIPine 10 MG Oral Tab Take 1 tablet (10 mg total) by mouth daily. 10 tablet 0 7/24/2024 at 1700    valsartan 160 MG Oral Tab Take 1 tablet (160 mg total) by mouth daily. 10 tablet 0 7/24/2024 at 1700    Melatonin 10 MG Oral Tab Take 1 tablet by mouth nightly as needed.   More than a month       Allergies: Fennel and Shellfish-derived products      Anesthesia Evaluation    Patient summary reviewed.    Anesthetic Complications  (-) history of anesthetic complications         GI/Hepatic/Renal      (+) GERD                           Cardiovascular                (+) obesity  (+) hypertension                                      Endo/Other    Negative endo/other ROS.                              Pulmonary      (+) asthma              (+) sleep apnea       Neuro/Psych        (+) anxiety                              Past Surgical History:   Procedure Laterality Date    Upper gi endoscopy,exam       Social History     Socioeconomic History    Marital status:     Number of children: 1   Occupational History     Comment: [employer] (has PhD in chemistry)   Tobacco Use    Smoking status: Never    Smokeless tobacco: Never   Vaping Use    Vaping status: Never Used   Substance and Sexual Activity    Alcohol use: Not Currently    Drug use: Never   Other Topics Concern    Caffeine Concern Yes     Comment: 1-2 cups coffee daily    Exercise Yes     Comment: runs twice weekly    Seat Belt Yes     History   Drug Use Unknown     Available pre-op labs reviewed.  Lab Results   Component Value Date    WBC 6.6 07/13/2024    RBC 4.60 07/13/2024    HGB 13.1 07/13/2024    HCT 40.6 07/13/2024    MCV 88.3 07/13/2024    MCH 28.5 07/13/2024    MCHC 32.3 07/13/2024    RDW 13.2 07/13/2024    .0 07/13/2024     Lab Results   Component Value Date     07/13/2024    K 3.8 07/13/2024     07/13/2024    CO2 23.0 07/13/2024    BUN 11 07/13/2024    CREATSERUM 0.72 07/13/2024     (H) 07/13/2024    CA 9.0 07/13/2024            Airway      Mallampati: III  Mouth opening: >3 FB  TM distance: 4 - 6 cm  Neck ROM: full Cardiovascular      Rhythm: regular  Rate: normal     Dental    Dentition appears grossly intact         Pulmonary      Breath sounds clear to auscultation bilaterally.               Other findings              ASA: 3   Plan: general  NPO status verified and patient meets guidelines.          Plan/risks discussed with: patient (Risks discussed including nausea, vomiting, dental damage, stroke and heart attack.  Patient understands plan and wishes to proceed.)                Present on Admission:  **None**

## 2024-07-26 ENCOUNTER — PATIENT MESSAGE (OUTPATIENT)
Dept: FAMILY MEDICINE CLINIC | Facility: CLINIC | Age: 34
End: 2024-07-26

## 2024-07-26 VITALS
RESPIRATION RATE: 18 BRPM | DIASTOLIC BLOOD PRESSURE: 61 MMHG | SYSTOLIC BLOOD PRESSURE: 120 MMHG | HEART RATE: 75 BPM | WEIGHT: 315 LBS | TEMPERATURE: 98 F | BODY MASS INDEX: 42.66 KG/M2 | OXYGEN SATURATION: 97 % | HEIGHT: 72 IN

## 2024-07-26 DIAGNOSIS — I10 ESSENTIAL HYPERTENSION: ICD-10-CM

## 2024-07-26 DIAGNOSIS — J35.01 CHRONIC TONSILLITIS: ICD-10-CM

## 2024-07-26 RX ORDER — ONDANSETRON 4 MG/1
4 TABLET, ORALLY DISINTEGRATING ORAL EVERY 6 HOURS PRN
Qty: 10 TABLET | Refills: 0 | Status: SHIPPED | OUTPATIENT
Start: 2024-07-26

## 2024-07-26 RX ORDER — AMOXICILLIN AND CLAVULANATE POTASSIUM 400; 57 MG/5ML; MG/5ML
10 POWDER, FOR SUSPENSION ORAL 2 TIMES DAILY
Qty: 60 ML | Refills: 0 | Status: SHIPPED | OUTPATIENT
Start: 2024-07-26

## 2024-07-26 NOTE — PROGRESS NOTES
NURSING DISCHARGE NOTE    Discharged Home via Ambulatory.  Accompanied by Family member and Spouse.  Belongings Taken by patient/family.

## 2024-07-26 NOTE — PLAN OF CARE
Alert and oriented x4. Maintaining oxygenation above 93% while at rest and awake on , NSR on telemetry. Denies dyspnea, lightheadedness, chest pain.   Gauze to nose with scant new serosanguionous drainage, area cleansed and dressing change with gauze and tape performed.  Normotensive, afebrile, denies chest pain, declining SCDs, up standby assist with steady gait, voiding adequately.   Scopolamine patch visualized behind left ear    0425  Up to bathroom, flatus present, BM per patient, formed.

## 2024-07-26 NOTE — PROGRESS NOTES
[facility]  Progress Note    Renzo Stevens Patient Status:  Observation    1990 MRN [id number]   Location [facility] 3NW-A Attending Todd Mendiola MD   Hosp Day # 0 PCP Samantha Austni DO       SUBJECTIVE:  PAIN UNDER CONTROL    OBJECTIVE:  Vital signs in last 24 hours:  /70 (BP Location: Right arm)   Pulse 85   Temp 97.7 °F (36.5 °C) (Oral)   Resp 18   Ht 6' (1.829 m)   Wt (!) 357 lb (161.9 kg)   SpO2 93%   BMI 48.42 kg/m²     Intake/Output:    Intake/Output Summary (Last 24 hours) at 2024 0727  Last data filed at 2024 2355  Gross per 24 hour   Intake 2263 ml   Output 995 ml   Net 1268 ml       Wt Readings from Last 3 Encounters:   24 (!) 357 lb (161.9 kg)   07/15/24 (!) 360 lb 3.2 oz (163.4 kg)   24 (!) 360 lb 12.8 oz (163.7 kg)       Exam  Gen: No acute distress, alert and oriented x3, no focal neurologic deficits  NO BLEEDING.    Data Review:     Labs:            Meds:   Current Facility-Administered Medications   Medication Dose Route Frequency    scopolamine (Transderm-Scop) 1 MG/3DAYS patch 1 patch  1 patch Transdermal Once    melatonin cap/tab 10 mg  10 mg Oral Nightly PRN    amLODIPine (Norvasc) tab 10 mg  10 mg Oral Daily    losartan (Cozaar) tab 100 mg  100 mg Oral Daily    sodium chloride 0.9% infusion   Intravenous Continuous    acetaminophen (Tylenol) 160 MG/5ML oral liquid 650 mg  650 mg Oral Q4H While awake    oxyCODONE immediate release partial tab 5 mg  5 mg Oral Q4H PRN    Or    oxyCODONE immediate release tab 10 mg  10 mg Oral Q4H PRN    HYDROmorphone (Dilaudid) 1 MG/ML injection 0.4 mg  0.4 mg Intravenous Q2H PRN    Or    HYDROmorphone (Dilaudid) 1 MG/ML injection 0.8 mg  0.8 mg Intravenous Q2H PRN    ondansetron (Zofran) 4 MG/2ML injection 4 mg  4 mg Intravenous Q6H PRN    Or    ondansetron (Zofran) tab 4 mg  4 mg Oral Q6H PRN    Or    ondansetron (Zofran-ODT) disintegrating tab 4 mg  4 mg Oral Q6H PRN    amoxicillin-pot  clavulanate (Augmentin) 400-57 mg/5mL oral suspension 10 mL  10 mL Oral BID    HYDROcodone-acetaminophen (Norco) 5-325 MG per tab 1 tablet  1 tablet Oral Q4H PRN    Or    HYDROcodone-acetaminophen (Norco) 5-325 MG per tab 2 tablet  2 tablet Oral Q4H PRN    Or    HYDROcodone-acetaminophen 7.5-325 MG/15ML solution 10 mL  10 mL Oral Q4H PRN    Or    HYDROcodone-acetaminophen 7.5-325 MG/15ML solution 15 mL  15 mL Oral Q4H PRN         Assessment  Patient Active Problem List   Diagnosis    Generalized anxiety disorder    Chronic cough    Essential hypertension    Morbid (severe) obesity due to excess calories (HCC)       Plan:   HOME ON AUGMENTIN AND PAIN CONTROL.    Questions/concerns were discussed with patient and/or family by bedside.            Todd Mendiola MD  7/26/2024  7:27 AM

## 2024-07-26 NOTE — OPERATIVE REPORT
[facility]    PATIENT'S NAME: ERIK GRAYSON   ATTENDING PHYSICIAN: Todd Mendiola M.D.   OPERATING PHYSICIAN: Todd Mendiola M.D.   PATIENT ACCOUNT#:   [id number]    LOCATION:  [address] [place] [address]  MEDICAL RECORD #:   [id number]       YOB: 1990  ADMISSION DATE:       07/25/2024      OPERATION DATE:  07/25/2024    OPERATIVE REPORT    PREOPERATIVE DIAGNOSIS:    1.   Hypertrophic tonsils.  2.   Deviated nasal septum.  3.   Inferior turbinate hypertrophy.  POSTOPERATIVE DIAGNOSIS:    1.   Hypertrophic tonsils.  2.   Deviated nasal septum.  3.   Inferior turbinate hypertrophy.    PROCEDURE:    1.   Nasal septoplasty.    2.   Outfracture and shaver submucous resection of inferior turbinates.  3.   Tonsillectomy.    ANESTHESIA:  General endotracheal.    FINDINGS:  Severely enlarged tonsils with a very narrow oropharyngeal airway, along with a deviated septum and turbinate hypertrophy.    OPERATIVE TECHNIQUE:  After satisfactory general endotracheal anesthesia induction, the patient was prepared for the procedure.  Lidocaine 1% with epinephrine was injected at the nasal septum and also at the lateral nasal wall.  Cocaine 4% packs were placed in both sides of the nose.  The area was then prepped and draped in the usual sterile fashion.    Attention was turned to tonsillectomy.  The Caity-Nick mouth gag was placed.  The soft and hard palate were palpated, inspected, and noted to be normal.  A red rubber catheter was placed through the nose to retract the soft palate.  Mirror inspection revealed no significant adenoid tissue.    The left tonsil was grabbed with a curved Allis clamp.  The tonsil was pulled medially.  Cut electrocautery was then used to make an incision in the mucosa, and then Bovie electrocautery was used to remove most of the tonsil, taking care to stay within the tonsillar capsule.  The patient had very large base of tongue and a difficult airway, which made it difficult to  access the inferior portion of the tonsil.  The same procedure was performed on the right-hand side without difficulty.  The mouth gag was let down.  After a few minutes, the mouth gag was re-expanded.  Any areas suspicious for future bleeding were cauterized with the Coblator.  Both tonsil beds were injected with local anesthetic.  The stomach was suctioned of any residual fluid.  The mouth gag was removed.    Attention was turned to the septoplasty.  A 15 blade was used to make an incision on the left-hand side of the septum.  A mucoperichondrial flap was raised using Srinivasan elevator.  An incision was made at the bony cartilage junction, and the mucoperiosteal flap raised on the other side.  Deviated nasal vomer and ethmoid bone removed using John forceps.  The septal mucosal flaps were then closed with a septal stapler.  Each inferior turbinate was outfractured with #7 osteotome.  A 15 blade was used to make an incision in each inferior turbinate.  A mucoperiosteal flap was raised using a [place] elevator.  The shaver was then used to remove submucosa and bone, thereby reducing the size of the turbinates.  Dissolvable nasal packing was placed in both sides of the nose.  The patient was awoken, extubated, and transferred to recovery room in stable condition.    Dictated By Todd Mendiola M.D.  d: 07/25/2024 14:05:07  t: 07/25/2024 19:55:02  [facility] [id number]  /

## 2024-07-26 NOTE — TELEPHONE ENCOUNTER
Patient called in regards to hydrocodone prescription. The pharmacy it  was originally sent to did not have it in stock. A new prescription needs to be sent to the [facility] on [address] which is updated and on file. Pharmacy only has 230 ml available but patient is ok with this amount.

## 2024-07-26 NOTE — PROGRESS NOTES
A&Ox4. VSS. RA. .  Telemetry: NSR  GI: Abdomen soft, nondistended.   Denies nausea.  : Voids.  Pain controlled with PRN pain medications  Up ad marisol.  Incisions:gauze to nose CDI  Diet:tolerating full liquids  IV saline locked  All appropriate safety measures in place. All questions and concerns addressed. Will continue to monitor

## 2024-07-26 NOTE — PROGRESS NOTES
Patient A&Ox4; lethargic. Vital signs stable on room air. Pain mild; tylenol given. asleep upon reassessment. Dressing changes x1, small amount of bloody drainage from bilateral nares. Clear liquid diet being tolerated well. Patient and wife updated on plan of care. Questions and concerns discussed.

## 2024-07-26 NOTE — DISCHARGE INSTRUCTIONS
[facility]                                   [address]  Todd Mendiola MD                                                  [place]  [address] [phone]      Tonsillectomy Instructions  Call the office within several days of surgery to arrange a follow-up appointment   HEMORRHAGE (BLEEDING):  A small percentage of patients will bleed at home following a tonsillectomy. In most cases, this will not be severe and will consist of spitting up a clot of blood followed by minimal bleeding for a period of 5-10 minutes.  Please call our office immediately if bleeding lasts more than 10-15 minutes, or is profuse.  AVOID ASPIRIN OR ANY NON-STEROIDAL ANTI-INFLAMMATORY MEDICATION (examples include: ibuprofen, Motrin, Advil, Aleve, naproxen) OR HERBAL SUPPLEMENTS (especially vitamin C, fish oil & gingko,) FOR 2 WEEKS AFTER SURGERY.   No vigorous physical activity for 14 days- this can cause bleeding.  Do not gargle (rinsing the mouth and brushing teeth are OK).  Avoid coughing or vigorous throat clearing.    WHAT TO EXPECT:  Throat Pain  Throat pain may last up to 2 weeks following surgery, and it is not unusual for the pain to worsen around days 4-6 due to normal changes in the throat during the healing process.  Ear Pain  Nerves that sense throat pain are shared by those that provide sensation from the ears, so patients may sometimes feel as if the ears hurt.  Bad Breath  Bad breath is normal during the healing process.  Tooth brushing and gum chewing are permissible, but avoid gargles or mouth washes.  Low-grade fevers  The inflammatory process from the throat can sometimes produce low-grade fevers (up to 101 degrees farenheit).  If fevers over 101 are experienced, please call our office.    MEDICATIONS:  You will receive a prescription for pain medication (usually Tylenol with codeine).  INSTEAD OF the prescription medication,  patients may try over-the-counter Tylenol (acetaminophen).  Children should be dosed according to weight, as indicated on the product packaging.     DIET:  Soft foods and cool drinks are best.  DO NOT drink through a straw.  Avoid acidic, spicy, crunchy, or sharp foods (especially “the 5 P’s”: peanuts, popcorn, potato chips, pretzels and pizza)     Nasal Sinus and Septal Surgery (Septoplasty) Instructions   Postoperative period  Pain is normal after nasal surgery, and is usually well-controlled with medication.  You should keep your head elevated to keep swelling and pain to a minimum.  Please avoid non-steroidal anti-inflammatory medications / NSAIDs (ex. Ibuprofen, Motrin, Advil, Aleve, etc.) and aspirin for 2 weeks after surgery, as these can increase bleeding.  Stuffiness is to be expected due to swelling in the nose from surgery, presence of packing or splints in the nose, and increase in nasal secretions due to inflammation from surgery.  This will improve with time.  Nasal bleeding is normal after septal surgery. 2-3 hours after surgery you may see an increase in bleeding as the medications used in surgery wear off.  A gauze dressing will be placed under your nose to absorb any blood.  It may need to be changed as frequently as every 10-15 minutes for 2-3 hours after surgery and then less frequently after throughout the next several days.     Activity  You will need to rest (off work or school) for 1 week after surgery. No vigorous activity should be performed for 2 weeks.      Diet  Eat a bland, light diet for about 24 hours after your surgery.  After that, a regular diet may usually be resumed.  Nausea experienced after taking pain medications can worsen if they are taken on an empty stomach.  It is preferable to take prescription pain medications with small amounts of food.    Wound care  Packing and/or splints may be placed in the nose by your surgeon.  These will be removed by your surgeon in the  office.  You may clean any crusted blood from around the nostrils gently with hydrogen peroxide mixed with water on a Q-tip.  After surgery use saline spray in the nose frequently (at least 5 times daily).  Nasal saline spray can be purchased without a prescription, and examples include: Ocean Spray®, Ayr® Saline Nasal Mist, and generic equivalents.  Avoid blowing the nose for at least 48 hours after surgery, and sneeze with your mouth open to keep pressure off of the nose, which can increase bleeding and pain.    Medication  It is important to fill all prescriptions written by your surgeon.   These usually include a pain reliever and antibiotic.  Please do not drive, operate machinery, or drink alcohol within at least 8 hours of taking prescription pain medications.    Follow-up    Appointments are usually planned for one week after surgery, but you may be asked to return sooner if any packing needs to be removed.  Call the office to schedule your appointment, or with any questions.

## 2024-07-29 NOTE — TELEPHONE ENCOUNTER
From: Renzo Stevens  To: Samantha Austin  Sent: 7/26/2024 5:24 PM CDT  Subject: Blood pressure meds after surgery    Hi Dr Austin,    So I haven’t taken my amlodipine or valsartan since Wednesday evening since they didn’t administer it in the hospital… while was at the hospital my BP was way lower than usual, even compared to when I am on the medications, and I’ve been keeping track of my BP since I got home and the highest I’ve recorded was 130/62. I’m almost worried that if I take the meds my BP might drop too much. Which is a weird thought, haha.    Do you think I should take the meds anyway? Or should I hold off and keep monitoring with my cuff?

## 2024-07-30 ENCOUNTER — OFFICE VISIT (OUTPATIENT)
Facility: LOCATION | Age: 34
End: 2024-07-30
Payer: COMMERCIAL

## 2024-07-30 DIAGNOSIS — J35.3 TONSILLAR AND ADENOID HYPERTROPHY: ICD-10-CM

## 2024-07-30 DIAGNOSIS — J34.2 DEVIATED NASAL SEPTUM: Primary | ICD-10-CM

## 2024-07-30 PROCEDURE — 99024 POSTOP FOLLOW-UP VISIT: CPT | Performed by: OTOLARYNGOLOGY

## 2024-07-30 RX ORDER — VALSARTAN 160 MG/1
160 TABLET ORAL DAILY
Qty: 90 TABLET | Refills: 1 | Status: SHIPPED | OUTPATIENT
Start: 2024-07-30

## 2024-07-30 RX ORDER — AMLODIPINE BESYLATE 10 MG/1
10 TABLET ORAL DAILY
Qty: 90 TABLET | Refills: 1 | Status: SHIPPED | OUTPATIENT
Start: 2024-07-30

## 2024-07-30 NOTE — PROGRESS NOTES
He is postop septoplasty tonsillectomy doing well.  He is already breathing better.  He has had very little in terms of bleeding.    The nose was suctioned out of any debris.  The septum and tonsil beds are healing well.    Stable postop septoplasty tonsillectomy in a patient with sleep apnea.  His breathing is already improving.    He will continue with nasal saline and see me back in 3 weeks for recheck.

## 2024-08-20 ENCOUNTER — OFFICE VISIT (OUTPATIENT)
Facility: LOCATION | Age: 34
End: 2024-08-20
Payer: COMMERCIAL

## 2024-08-20 ENCOUNTER — PATIENT MESSAGE (OUTPATIENT)
Facility: LOCATION | Age: 34
End: 2024-08-20

## 2024-08-20 DIAGNOSIS — J35.3 TONSILLAR AND ADENOID HYPERTROPHY: ICD-10-CM

## 2024-08-20 DIAGNOSIS — J34.2 DEVIATED NASAL SEPTUM: Primary | ICD-10-CM

## 2024-08-20 PROCEDURE — 99024 POSTOP FOLLOW-UP VISIT: CPT | Performed by: OTOLARYNGOLOGY

## 2024-08-20 RX ORDER — CEFUROXIME AXETIL 250 MG/1
250 TABLET ORAL 2 TIMES DAILY
Qty: 20 TABLET | Refills: 0 | Status: SHIPPED | OUTPATIENT
Start: 2024-08-20

## 2024-08-20 NOTE — PROGRESS NOTES
Renzo Stevens is a 34 year old male.   Chief Complaint   Patient presents with    Post-Op     HPI:   He is postop septoplasty tonsillectomy back in July.  He is doing well.  He is breathing much better at night.    REVIEW OF SYSTEMS:   GENERAL HEALTH: feels well otherwise  GENERAL : denies fever, chills, sweats, weight loss, weight gain  SKIN: denies any unusual skin lesions or rashes  RESPIRATORY: denies shortness of breath with exertion  NEURO: denies headaches    EXAM:   There were no vitals taken for this visit.    System Findings Details   Constitutional  Overall appearance - Normal.   Psychiatric  Orientation - Oriented to time, place, person & situation. Appropriate mood and affect.   Head/Face  Facial features -- Normal. Skull - Normal.   Eyes  Pupils equal ,round ,react to light and accomidate   Ears, Nose, Throat, Neck  Nose is healing well without signs of infection oropharynx is also healed well with better oropharyngeal airway   Neurological  Memory - Normal. Cranial nerves - Cranial nerves II through XII grossly intact.   Lymph Detail  Submental. Submandibular. Anterior cervical. Posterior cervical. Supraclavicular.       ASSESSMENT AND PLAN:   1. Deviated nasal septum  Stable status post septoplasty and tonsillectomy.  He is healing well.  He will continue on nasal saline.    2. Tonsillar and adenoid hypertrophy        The patient indicates understanding of these issues and agrees to the plan.    No follow-ups on file.    Todd Mendiola MD  8/20/2024  10:19 AM

## 2024-08-20 NOTE — TELEPHONE ENCOUNTER
From: Renzo Stevens  To: Todd Mendiola  Sent: 8/20/2024 5:25 PM CDT  Subject: Sinus headache    Hey Dr Mendiola,    You called it — I’ve been having a sinus headache all afternoon, though my mucus is still clear. Currently taking acetaminophen to mediate but if you’d recommend an antibiotic I’ll sort that out.

## 2024-08-21 ENCOUNTER — OFFICE VISIT (OUTPATIENT)
Dept: INTERNAL MEDICINE CLINIC | Facility: CLINIC | Age: 34
End: 2024-08-21
Payer: COMMERCIAL

## 2024-08-21 ENCOUNTER — PATIENT MESSAGE (OUTPATIENT)
Dept: INTERNAL MEDICINE CLINIC | Facility: CLINIC | Age: 34
End: 2024-08-21

## 2024-08-21 VITALS
BODY MASS INDEX: 44.1 KG/M2 | HEIGHT: 71 IN | WEIGHT: 315 LBS | SYSTOLIC BLOOD PRESSURE: 140 MMHG | DIASTOLIC BLOOD PRESSURE: 62 MMHG | HEART RATE: 96 BPM | RESPIRATION RATE: 16 BRPM

## 2024-08-21 DIAGNOSIS — R73.03 PREDIABETES: ICD-10-CM

## 2024-08-21 DIAGNOSIS — G47.33 OSA ON CPAP: ICD-10-CM

## 2024-08-21 DIAGNOSIS — F41.1 GENERALIZED ANXIETY DISORDER: ICD-10-CM

## 2024-08-21 DIAGNOSIS — Z51.81 THERAPEUTIC DRUG MONITORING: Primary | ICD-10-CM

## 2024-08-21 DIAGNOSIS — I10 ESSENTIAL HYPERTENSION: Primary | ICD-10-CM

## 2024-08-21 DIAGNOSIS — E66.01 MORBID (SEVERE) OBESITY DUE TO EXCESS CALORIES (HCC): ICD-10-CM

## 2024-08-21 DIAGNOSIS — I10 ESSENTIAL HYPERTENSION: ICD-10-CM

## 2024-08-21 DIAGNOSIS — E66.01 MORBID (SEVERE) OBESITY DUE TO EXCESS CALORIES (HCC): Primary | ICD-10-CM

## 2024-08-21 PROCEDURE — 99214 OFFICE O/P EST MOD 30 MIN: CPT | Performed by: NURSE PRACTITIONER

## 2024-08-21 RX ORDER — METFORMIN HCL 500 MG
500 TABLET, EXTENDED RELEASE 24 HR ORAL 2 TIMES DAILY WITH MEALS
Qty: 60 TABLET | Refills: 2 | Status: SHIPPED | OUTPATIENT
Start: 2024-08-21

## 2024-08-21 NOTE — PROGRESS NOTES
HISTORY OF PRESENT ILLNESS  Chief Complaint   Patient presents with    Weight Problem     Recommendation from [facility] , never try meds and open for med . Not interest in getting surgery      Renzo Stevens is a 34 year old male here for follow up with medical weight loss program for the treatment of overweight, obesity, or morbid obesity.     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 not tracking reports  Meals out per week on average: 0-2  Stress is high, but manageable- 8/10  Sleep:6  hours/night, waking up feeling rested    Denies chest pain, shortness of breath, dizziness, blurred vision, headache, paresthesia, nausea/vomiting.     Breakfast Lunch Dinner Snacks Fluids   Reviewed              Wt Readings from Last 6 Encounters:   08/21/24 (!) 342 lb (155.1 kg)   07/25/24 (!) 357 lb (161.9 kg)   07/15/24 (!) 360 lb 3.2 oz (163.4 kg)   05/03/24 (!) 360 lb 12.8 oz (163.7 kg)   06/14/23 (!) 333 lb (151 kg)   02/03/23 (!) 315 lb (142.9 kg)          REVIEW OF SYSTEMS  GENERAL: feels well otherwise, denied any fevers chills or night sweats   LUNGS: denies shortness of breath  CARDIOVASCULAR: denies chest pain  GI: denies abdominal pain  MUSCULOSKELETAL: denies back pain, joint pains   PSYCH: denies change in behavior or mood, denies feeling sad or depressed    EXAM  /62   Pulse 96   Resp 16   Ht 5' 11\" (1.803 m)   Wt (!) 342 lb (155.1 kg)   BMI 47.70 kg/m²       GENERAL: well developed, well nourished, in no apparent  distress, A/O x3  SKIN: no rashes, no suspicious lesions  HEENT: atraumatic, normocephalic, OP-clear, PERRLA  NECK: supple, no adenopathy  LUNGS: CTA in all fields, breathing non labored  CARDIO: RRR without murmur  GI: +BS, NT/ND, no masses or HSM  EXTREMITIES: no cyanosis, no clubbing, no edema    Lab Results   Component Value Date     (H) 07/13/2024    BUN 11 07/13/2024    CREATSERUM 0.72 07/13/2024    ANIONGAP 10 07/13/2024    CA 9.0 07/13/2024    OSMOCALC 294 07/13/2024    ALKPHO 49 07/13/2024    AST 30 07/13/2024    ALT 73 (H) 07/13/2024    BILT 0.3 07/13/2024    TP 7.2 07/13/2024    ALB 3.9 07/13/2024    GLOBULIN 3.3 07/13/2024     07/13/2024    K 3.8 07/13/2024     07/13/2024    CO2 23.0 07/13/2024     Lab Results   Component Value Date     01/27/2023    A1C 5.7 (H) 01/27/2023     Lab Results   Component Value Date    CHOLEST 159 01/27/2023    TRIG 114 01/27/2023    HDL 43 01/27/2023    LDL 95 01/27/2023    VLDL 19 01/27/2023    NONHDLC 116 01/27/2023     Lab Results   Component Value Date    B12 478 07/13/2024     No results found for: \"VITD\", \"QVITD\", \"IPAP42FZ\"    Current Outpatient Medications on File Prior to Visit   Medication Sig Dispense Refill    cefuroxime 250 MG Oral Tab Take 1 tablet (250 mg total) by mouth 2 (two) times daily. 20 tablet 0    amLODIPine 10 MG Oral Tab Take 1 tablet (10 mg total) by mouth daily. 90 tablet 1    valsartan 160 MG Oral Tab Take 1 tablet (160 mg total) by mouth daily. 90 tablet 1    ondansetron 4 MG Oral Tablet Dispersible Take 1 tablet (4 mg total) by mouth every 6 (six) hours as needed. 10 tablet 0    Melatonin 10 MG Oral Tab Take 1 tablet by mouth nightly as needed.       No current facility-administered medications on file prior to visit.       ASSESSMENT/PLAN    ICD-10-CM    1. Therapeutic drug monitoring  Z51.81       2. Morbid (severe) obesity due to excess calories (HCC)  E66.01       3. Essential hypertension  I10       4. Generalized  anxiety disorder  F41.1       5. Prediabetes  R73.03       6. CHARU on CPAP  G47.33           PLAN   Initial Weight Data and Goal Weight Loss:  Initial consult: #325 lbs on 7/2021  Weight Calculations  Initial Weight: 325 lbs  Initial Weight Date: 07/01/21  Today's Weight: 342 lbs  5% Goal: 16.25  10% Goal: 32.5  Total Weight Loss: -17 lbs  Total weight loss: up #17 lbs total, Net loss +17 lbs  Will trial medications: metformin 500mg bid for prediabetes  Will check with insurance on coverage for GLP-1 meds  --advised of side effects and adverse effects of this medication  Contradictions: none  Reviewed labs  Body composition: body fat 40.4%, visceral fat 31, and muscle mass 64 lbs    Continue with vitamin d OTC   Anxiety, stable  HTN  blood pressure is in office is stable - continue present management  Prediabetes, last A1c 5.7% on 1/2023- will trial metformin   CHARU- on cpap machine   HTN, stable, managed by PCP   Nutrition: Low carb diet, recommended to eat breakfast daily/ regular protein intake  Follow up with dietitian and psychologist as recommended.  Discussed the role of sleep and stress in weight management.  Counseled on comprehensive weight loss plan including attention to nutrition, exercise and behavior/stress management for success. See patient instruction below for more details.  Discussed strategies to overcome barriers to successful weight loss and weight maintenance  FITTE: ACSM recommendations (150-300 minutes/ week in active weight loss)   Weight Loss Consent to treat reviewed and signed.    Total time spent on chart review, pre-charting, obtaining history, counseling, and educating, reviewing labs was 50 minutes.       NOTE TO PATIENT: The 21st Century Cures Act makes clinical notes like these available to patients in the interest of transparency. Clinical notes are medical documents used by physicians and care providers to communicate with each other. These documents include medical language and  terminology, abbreviations, and treatment information that may sound technical and at times possibly unfamiliar. In addition, at times, the verbiage may appear blunt or direct. These documents are one tool providers use to communicate relevant information and clinical opinions of the care providers in a way that allows common understanding of the clinical context.     Patient Instructions   Welcome to the [facility]!!  Thank you for placing your trust in our health care team, I look forward to working with you along this journey to better health!  Next steps:     1.  Schedule a personal nutrition consultation with one of our registered dieticians. Bring along your food journal (3 days minimum). See journal options below.  2.  Fill your prescribed medication and take as discussed and prescribed: metformin 500mg bid (1 tablet with breakfast and 1 tablet with dinner)   3. Check with insurance if coverage- wegovy and zepbound     Please try to work on the following dietary changes this first month:  Daily protein recommendation to start:  grams  Daily carbohydrate: <210g  Daily calories: 2,500  1.  Drink water with meals and throughout the day, cut down on soda and/or juice if consumed. Consider flavored water options like Bubbly, Spindrift, Hint and Chris.  2.  Eat breakfast daily and focus on having protein with each meal, examples include: greek yogurt, cottage cheese, hard boiled egg, whole grain toast with peanut butter.   3.  Reduce refined carbohydrates and sugars which includes items such as sweets, as well as rice, pasta, and bread and make sure to choose whole grain options when having them with just 1 serving per meal about the size of your inner palm.  4.  Consume non starchy veggies daily working towards making them a good 50% of your daily food intake. Add them to lunch and dinner consistently.  5.  Optional can start a daily probiotic: VSL#3, (order on line at  www.VQiao.com.com). Take 1 capsule daily with water for 30 days, then reduce to 1 every other day (this will reduce the cost). Capsules can be left out for 2 weeks, but then must be refrigerated.      Please download john My Fitness Pal, LoseIt! Or Net Diary to monitor daily dietary intake and you will be able to see if you are eating the right amount of calories or too much or too little which would hinder weight loss. Additionally this will help to see your daily carbohydrate and protein intake. When you set the john up choose 1-2 lbs/week as a goal.  Keeping a paper food journal is an option as well to remain accountable for your choices- this is the start to mindful eating! A low calorie diet has been consistently shown to support weight loss.     Continue or start exercising to help establish a routine. If not already exercising begin with 1 day and progress as able with long-term goal of 30 minutes 5 days a week at a minimum.     Meditation daily can help manage and control stress. Chronic stress can make weight loss difficult.  Exercising is one way to help with stress, but meditation using the CALM John or another comparable alternative can be done in your home or place of work with little time commitment. This John can also help work on behavior change and improve sleep. Check out the segment under Calm Masterclass and listen to The 4 Pillars of Health. A great way to begin learning about the foundation of lifestyle with practical tips to use in your every day.     Check out www.yourweightmatters.org blog for continued daily support and education along this weight loss journey!    Patient Resources:     Personal Training/Fitness Classes/Health Coaching     [facility] @ [email or web address]-center Full fitness center with group fitness and personal training. Discount available as client of [facility].  Health Coaching and Personal  Training with Emy Amin at our [place] [facility]- individual weekly coaching with option to add personal training and small group fitness classes targeted at weight loss- [phone] and/or email @ [email or web address]  360FIT [place] [email or web address]://www.[email or web address]. Group Fitness [phone] and/or email Angely at [email or web address]  [facility] Fitness Centers with multiple locations: Togic Software (www.ProxiVision GmbH), Grameen [employer] The Run2Sport (www.Jigsaw Meeting), Frankly Chat (www.ToonTime), The Exercise  (www.exercisecoachAplica)     Online Fitness  Fitness  on Mobakids  Fit in 10 DVD series- www.fditf78YZH.com  Sit and Be Fit - Chair exercise series Www.sitandbefit.org  Hip Hop Fit with Anthony Berry at www.hiphopfit.People Capital     Apps for on the Go Fitness  [employer] 7 Minute Workout (orange box with white 7) - free on the go HIIT training john  Peloton John @ www.onepeloton.com     Nutrition Trackers and Tools  LoseIT! And My Fitness Pal apps and on line for tracking nutrition  NOOM - virtual health coaching  FitFoundation (healthy meals on the go) in [place] @ www.vlbrkyhcfsxvh8bAplica  Mayelin MADRID @ www.bistromd.com and Kuzptt67 (keto and low carb plans recommended) @ www.pjiowo83.com, Metabolic Meals @ www.MyMetabolicMeals.com - individual prepared meals to go  Gobble, Blue Apron, Home , Every Plate, Sunbasket- on line meal delivery programs for preparation at home  Meal Village in [place] for homemade meals to go @ www.mealvillage.com  Diet Doctor @ www.dietdoctor.com - low carb swaps  Yummly - meal prep and planning john (www.yummly.com)     Stress Management/Behavior/Mindful Eating  CALM meditation john (www.calm.com)  Headspace  Am I Hungry? Mindful eating virtual  john  Www.yourweightmatters.org - Obesity Action Coalition sponsored Blog posts daily  Motivation john (black box with white \")- daily supportive messages sent to your phone     Books/Video  Education/Podcasts  Mindless Eating by Salvador Arias  Why We Get Sick by Jose Modi (a book about insulin resistance)  Atomic Habits by Manav Herrera (a book about taking small steps to promote greater behavior change)   Can't Hurt Me by Donis Mcintosh (a book exploring the power of discipline in achieving your goals)  The End of Dieting: How to Live for Life by Dr. Nicola Fitch M.D. or listen to The Mr. Youth Podcast Episode 63: Understanding \"Nutritarian\" Eating w/Dr. Nicola Fitch  Your Body in Balance: The New Science of Food, Hormones, and Health by Dr. Pastor Fine  The Menopause Diet Plan by Lizette Fierro and Gay Mcnamara  The Complete Guide to fasting by Dr. Justice  Sugar, Salt & Fat by Mariela Feliciano, Ph.D, R.D.  Weight Loss Surgery Will Not Treat Food Addiction by Selena Castillo Ph.D  The Game Changers- BioCurity Documentary on plant based nutrition  Fed Up - documentary about obesity (Free on Zipalong)  The Truth About Sugar - documentary on sugar (Free on Zipalong, https://youtu.be/0F6ivxfLG7a)  The Dr. Chung T5 Wellness Plan by Dr. Tim Chung MD  Fitlosophy Fitspiration - journal to better health (found at [employer] in fitness aisle)  What Happened to You?- a look at the impact trauma has on behavior written by Ryan Beckwith and Dr. Mohsen Eagle  Whole Again by Juan He - discovering your true self after trauma  Roseann Viera talk on BioCurity, The Call to Courage  Podcasts: The Exam Room by the Physician's Committee, Nutrition Facts by Dr. Brady    We are here to support you with weight loss, but please remember that you still need your primary care provider for your routine health maintenance.      Return in about 4 months (around 12/21/2024) for weight management.    Patient verbalizes understanding.    MADHU Fagan

## 2024-08-21 NOTE — PATIENT INSTRUCTIONS
Welcome to the [facility] Management Program!!  Thank you for placing your trust in our health care team, I look forward to working with you along this journey to better health!  Next steps:     1.  Schedule a personal nutrition consultation with one of our registered dieticians. Bring along your food journal (3 days minimum). See journal options below.  2.  Fill your prescribed medication and take as discussed and prescribed: metformin 500mg bid (1 tablet with breakfast and 1 tablet with dinner)   3. Check with insurance if coverage- wegovy and zepbound     Please try to work on the following dietary changes this first month:  Daily protein recommendation to start:  grams  Daily carbohydrate: <210g  Daily calories: 2,500  1.  Drink water with meals and throughout the day, cut down on soda and/or juice if consumed. Consider flavored water options like Bubbly, Spindrift, Hint and Chris.  2.  Eat breakfast daily and focus on having protein with each meal, examples include: greek yogurt, cottage cheese, hard boiled egg, whole grain toast with peanut butter.   3.  Reduce refined carbohydrates and sugars which includes items such as sweets, as well as rice, pasta, and bread and make sure to choose whole grain options when having them with just 1 serving per meal about the size of your inner palm.  4.  Consume non starchy veggies daily working towards making them a good 50% of your daily food intake. Add them to lunch and dinner consistently.  5.  Optional can start a daily probiotic: VSL#3, (order on line at www.vsl3.com). Take 1 capsule daily with water for 30 days, then reduce to 1 every other day (this will reduce the cost). Capsules can be left out for 2 weeks, but then must be refrigerated.      Please download torsten My Fitness Pal, LoseIt! Or Net Diary to monitor daily dietary intake and you will be able to see if you are eating the right amount of calories or too much or too little which would  hinder weight loss. Additionally this will help to see your daily carbohydrate and protein intake. When you set the john up choose 1-2 lbs/week as a goal.  Keeping a paper food journal is an option as well to remain accountable for your choices- this is the start to mindful eating! A low calorie diet has been consistently shown to support weight loss.     Continue or start exercising to help establish a routine. If not already exercising begin with 1 day and progress as able with long-term goal of 30 minutes 5 days a week at a minimum.     Meditation daily can help manage and control stress. Chronic stress can make weight loss difficult.  Exercising is one way to help with stress, but meditation using the CALM John or another comparable alternative can be done in your home or place of work with little time commitment. This John can also help work on behavior change and improve sleep. Check out the segment under Calm Masterclass and listen to The 4 Pillars of Health. A great way to begin learning about the foundation of lifestyle with practical tips to use in your every day.     Check out www.yourweightmatters.org blog for continued daily support and education along this weight loss journey!    Patient Resources:     Personal Training/Fitness Classes/Health Coaching     [facility] @ [email or web address]/healthy-[email or web address]/[email or web address]-center Full fitness center with group fitness and personal training. Discount available as client of [facility].  Health Coaching and Personal Training with Emy Amin at our [place] [facility]- individual weekly coaching with option to add personal training and small group fitness classes targeted at weight loss- [phone] and/or email @ [email or web address]  360FIT [place] http://www.BBL Enterprises.gShift Labs. Group Fitness [phone] and/or email Angely at [email or web address]  [facility] Fitness Centers with multiple  locations: GigaTrust (www.KingX Studios), Bizanga The Ingk Labs Fitness (www.B-Side Entertainment.Relux), Eruvaka Technologies (www.Urban Renewable H2), The Exercise  (www.exercisecoach.Relux)     Online Fitness  Fitness  on Utube  Fit in 10 DVD series- www.vkaiy60FRC.com  Sit and Be Fit - Chair exercise series Www.sitandbefit.org  Hip Hop Fit with Anthony Berry at www.hiphopfit.net     Apps for on the Go Fitness  Applied BioCode 7 Minute Workout (orange box with white 7) - free on the go HIIT training john  Peloton John @ www.onepeloton.com     Nutrition Trackers and Tools  LoseIT! And My Fitness Pal apps and on line for tracking nutrition  NOOM - virtual health coaching  FitFoundation (healthy meals on the go) in [place] @ www.npdqvwztmzzvr4kAmerican TV 2 Go  Bistro MD @ wwwTripsharebistromd.com and Qzitxf37 (keto and low carb plans recommended) @ www.upbcwf87.com, Metabolic Meals @ www.MyMetabolicMeals.com - individual prepared meals to go  Gobble, Blue Apron, Home , Every Plate, Sunbasket- on line meal delivery programs for preparation at home  Meal Village in [place] for homemade meals to go @ www.mealvillage.Relux  Diet Doctor @ www.dietdoctor.Relux - low carb swaps  Crossbar - meal prep and planning john (www.yummly.com)     Stress Management/Behavior/Mindful Eating  CALM meditation john (www.calm.com)  Headspace  Am I Hungry? Mindful eating virtual  john  Www.yourweightmatters.org - Obesity Action Coalition sponsored Blog posts daily  Motivation john (black box with white \")- daily supportive messages sent to your phone     Books/Video Education/Podcasts  Mindless Eating by Salvador Arias  Why We Get Sick by Jose Modi (a book about insulin resistance)  Atomic Habits by Manav Herrera (a book about taking small steps to promote greater behavior change)   Can't Hurt Me by Donis Mcintosh (a book exploring the power of discipline in achieving your goals)  The End of Dieting: How to Live for Life by Dr. Nicola Fitch M.D. or listen to  The RiseSmart Podcast Episode 63: Understanding \"Nutritarian\" Eating w/Dr. Nicola Fitch  Your Body in Balance: The New Science of Food, Hormones, and Health by Dr. Pastor Fine  The Menopause Diet Plan by Lizette Fierro and Gay Mcnamara  The Complete Guide to fasting by Dr. Justice  Sugar, Salt & Fat by Mariela Feliciano, Ph.D, R.D.  Weight Loss Surgery Will Not Treat Food Addiction by Selena Castillo Ph.D  The Game Changers- Altimetix Documentary on plant based nutrition  Fed Up - documentary about obesity (Free on Utube)  The Truth About Sugar - documentary on sugar (Free on Utube, https://youtu.be/6T7unwmKJ3r)  The Dr. Chung T5 Wellness Plan by Dr. Tim Chung MD  Fitlosophy Fitspiration - journal to better health (found at [employer] in fitness aisle)  What Happened to You?- a look at the impact trauma has on behavior written by Ryan Beckwith and Dr. Mohsen Eagle  Whole Again by Juan He - discovering your true self after trauma  Roseann Viera talk on BeeBillion, The Call to Courage  Podcasts: The Exam Room by the Physician's Committee, Nutrition Facts by Dr. Brady    We are here to support you with weight loss, but please remember that you still need your primary care provider for your routine health maintenance.

## 2024-08-21 NOTE — TELEPHONE ENCOUNTER
From: Renzo Stevens  To: Cele Grande  Sent: 8/21/2024 5:09 PM CDT  Subject: Insurance coverage for Wegovy    Hi Cele Soriano,    Wasn’t able to get an agent on the line but my plan lists Wegovy as in network with an 84 day supply costing $150. Is there more information that you would need? I will note that it only lists [facility], [facility], [facility] and [facility] as acceptable pharmacies.    Renzo

## 2024-08-22 RX ORDER — SEMAGLUTIDE 0.25 MG/.5ML
0.25 INJECTION, SOLUTION SUBCUTANEOUS WEEKLY
Qty: 2 ML | Refills: 0 | Status: SHIPPED | OUTPATIENT
Start: 2024-08-22 | End: 2024-09-13

## 2024-08-23 NOTE — TELEPHONE ENCOUNTER
Approved    Prior authorization approved  Payer: [employer] Commerical Case ID: [id number]    [phone]    [phone]  Note from payer: Request Reference Number: [id number].  WEGOVY       INJ 0.25MG is approved through 12/22/2024.  Your patient may now fill this prescription and it will be covered.  Approval Details    Authorization number: [id number]  Authorized from August 22, 2024 to December 22, 2024  Electronic appeal: Not supported  View History  Medication Being Authorized    semaglutide-weight management (WEGOVY) 0.25 MG/0.5ML Subcutaneous Solution Auto-injector  Inject 0.5 mL (0.25 mg total) into the skin once a week for 4 doses.  Dispense: 2 mL Refills: 0   Start: 8/22/2024 End: 9/13/2024   Class: Normal Diagnoses: Morbid (severe) obesity due to excess calories (HCC)   This order has been released to its destination.  To be filled at: [facility]/pharmacy #[id number] [place], [place] - [address] [phone], [phone]

## 2024-08-26 ENCOUNTER — PATIENT MESSAGE (OUTPATIENT)
Dept: INTERNAL MEDICINE CLINIC | Facility: CLINIC | Age: 34
End: 2024-08-26

## 2024-08-26 NOTE — TELEPHONE ENCOUNTER
From: Renzo Stevens  To: Cele Grande  Sent: 8/26/2024 3:43 PM CDT  Subject: Metallic taste?    Hi Cele Soriano,    I’ve had a persistent metallic taste in my mouth since starting on the metformin; it’s unpleasant but not necessarily the end of the world. Is there any way of mitigating this that you know of?    Renzo

## 2024-09-09 ENCOUNTER — PATIENT MESSAGE (OUTPATIENT)
Dept: INTERNAL MEDICINE CLINIC | Facility: CLINIC | Age: 34
End: 2024-09-09

## 2024-09-09 DIAGNOSIS — E66.01 MORBID (SEVERE) OBESITY DUE TO EXCESS CALORIES (HCC): Primary | ICD-10-CM

## 2024-09-09 DIAGNOSIS — E66.01 MORBID (SEVERE) OBESITY DUE TO EXCESS CALORIES (HCC): ICD-10-CM

## 2024-09-09 RX ORDER — SEMAGLUTIDE 0.25 MG/.5ML
0.25 INJECTION, SOLUTION SUBCUTANEOUS WEEKLY
Qty: 2 ML | Refills: 0 | Status: CANCELLED | OUTPATIENT
Start: 2024-09-09 | End: 2024-10-01

## 2024-09-09 NOTE — TELEPHONE ENCOUNTER
Requesting wegovy increase  LOV: 8/21/24  RTC: 4 months  Last Relevant Labs: na  Filled: 8/22/24 #2ml with 0 refills wegovy 0.25 mg    Future Appointments   Date Time Provider Department Center   1/8/2025 12:40 PM Cele Grande APRN [facility]   4/14/2025 10:20 AM Cele Grande APRN [facility]   7/14/2025 10:00 AM Cele Grande APRN [facility]

## 2024-09-09 NOTE — TELEPHONE ENCOUNTER
From: Renzo Stevens  To: Cele Grande  Sent: 9/9/2024 7:55 AM CDT  Subject: Progress update    Hi Cele Soriano,    Since I put in a refill request today I figured I’d update you on my progress so far.    Currently down to ~336 lbs, vs 343 on the date of my last visit with you.    Have maintained daily runs or bike rides each morning (the latter to relieve some soreness in my legs). Progress is slow, but my peak heart rate is down considerably and my rate-determining factor right now is more rooted in muscle soreness than cardio.    I’ve replaced virtually all of my snacks with high protein, low carb foods such as salted chickpeas, edamame beans, almonds and beef jerky (homemade on my smoker to reduce sugar content). Replaced breakfast with high fiber smoothies to counteract constipation.

## 2024-10-07 ENCOUNTER — PATIENT MESSAGE (OUTPATIENT)
Dept: INTERNAL MEDICINE CLINIC | Facility: CLINIC | Age: 34
End: 2024-10-07

## 2024-10-07 NOTE — TELEPHONE ENCOUNTER
From: Renzo Stevens  To: Cele Grande  Sent: 10/7/2024 1:02 PM CDT  Subject: Progress update (Oct 7)    Hi Dr Grande    We’re on the third dose of the higher concentration Wegovy today so I figured I’d give you another look into how things are going!    Weight loss is steady, with a high of 338 and low (yesterday) of 323 averaging 326 over the past 10 days. Exercise routine is keeping up; I’ve swapped a few runs for bike rides to mitigate soreness but I haven’t missed a day for workouts and am now consistently able to get through my entire morning run without going above 140 HR. I plan to increase distance on weekend runs. Constipation is still present but managed with diet and occasional Miralax. Have had one particularly bad bout of diarrhea but it was short-lived.

## 2024-10-13 DIAGNOSIS — E66.01 MORBID (SEVERE) OBESITY DUE TO EXCESS CALORIES (HCC): ICD-10-CM

## 2024-10-14 NOTE — TELEPHONE ENCOUNTER
Requesting   Requested Prescriptions     Pending Prescriptions Disp Refills    semaglutide-weight management 1 MG/0.5ML Subcutaneous Solution Auto-injector 2 mL 0     Sig: Inject 0.5 mL (1 mg total) into the skin once a week for 4 doses.       LOV: 08/21/2024  RTC: in about 4 months  Filled: 09/09/2024 #2ml with 1 refills    Future Appointments   Date Time Provider Department Center   1/8/2025 12:40 PM Cele Grande APRN [facility]   4/14/2025 10:20 AM Cele Grande APRN [facility]   7/14/2025 10:00 AM Cele Grande APRN [facility]        Patient Comment: Okay to go up to the next dose level -- have been managing difestive issues well enough.

## 2024-11-12 NOTE — TELEPHONE ENCOUNTER
Requesting   Requested Prescriptions     Pending Prescriptions Disp Refills    semaglutide-weight management 1.7 MG/0.75ML Subcutaneous Solution Auto-injector 3 mL 1     Sig: Inject 0.75 mL (1.7 mg total) into the skin once a week.       LOV: 08/21/2024  RTC: in about 4 months  Filled: 10/14/2024 #2ml with 1 refills    Future Appointments   Date Time Provider Department Center   1/8/2025 12:40 PM Cele Grande APRN [facility]   4/14/2025 10:20 AM Cele Grande APRN [facility]   7/14/2025 10:00 AM Cele Grande APRN [facility]     Hi Kori,     I weighed in at 311 today (attached my 30 day weight chart for your reference), imputing ~2.3 lbs per week lost. Have kept up with my running well and am seeing significant improvements in body composition. I had a rough bout of diarrhea the first week on 1 mg, but it’s unclear if it was the meds or something else. I would be willing to try going to 2.4 mg since the three subsequent doses were uneventful.

## 2024-11-22 RX ORDER — METFORMIN HYDROCHLORIDE 500 MG/1
500 TABLET, EXTENDED RELEASE ORAL 2 TIMES DAILY WITH MEALS
Qty: 60 TABLET | Refills: 2 | Status: SHIPPED | OUTPATIENT
Start: 2024-11-22

## 2024-11-22 NOTE — TELEPHONE ENCOUNTER
Requesting   Requested Prescriptions     Pending Prescriptions Disp Refills    metFORMIN  MG Oral Tablet 24 Hr 60 tablet 2     Sig: Take 1 tablet (500 mg total) by mouth 2 (two) times daily with meals.       LOV: 08/21/2024  RTC: in about 4 months   Filled: 08/21/2024 #60 with 2 refills    Future Appointments   Date Time Provider Department Center   1/8/2025 12:40 PM Cele Grande APRN [facility]   4/14/2025 10:20 AM Cele Grande APRN [facility]   7/14/2025 10:00 AM Cele Grande APRN [facility]

## 2024-12-09 ENCOUNTER — PATIENT MESSAGE (OUTPATIENT)
Dept: INTERNAL MEDICINE CLINIC | Facility: CLINIC | Age: 34
End: 2024-12-09

## 2024-12-10 ENCOUNTER — NURSE ONLY (OUTPATIENT)
Dept: INTERNAL MEDICINE CLINIC | Facility: CLINIC | Age: 34
End: 2024-12-10
Payer: COMMERCIAL

## 2024-12-10 RX ORDER — PANCRELIPASE LIPASE, PANCRELIPASE PROTEASE, PANCRELIPASE AMYLASE 40000; 126000; 168000 [USP'U]/1; [USP'U]/1; [USP'U]/1
40000 CAPSULE, DELAYED RELEASE ORAL
Qty: 48 CAPSULE | Refills: 0 | COMMUNITY
Start: 2024-12-10

## 2024-12-26 ENCOUNTER — PATIENT MESSAGE (OUTPATIENT)
Dept: INTERNAL MEDICINE CLINIC | Facility: CLINIC | Age: 34
End: 2024-12-26

## 2024-12-28 RX ORDER — PANCRELIPASE LIPASE, PANCRELIPASE PROTEASE, PANCRELIPASE AMYLASE 252600; 60000; 189600 [USP'U]/1; [USP'U]/1; [USP'U]/1
60000 CAPSULE, DELAYED RELEASE ORAL
Qty: 270 CAPSULE | Refills: 1 | Status: SHIPPED | OUTPATIENT
Start: 2024-12-28

## 2025-01-06 ENCOUNTER — PATIENT MESSAGE (OUTPATIENT)
Dept: INTERNAL MEDICINE CLINIC | Facility: CLINIC | Age: 35
End: 2025-01-06

## 2025-01-06 DIAGNOSIS — E66.01 MORBID (SEVERE) OBESITY DUE TO EXCESS CALORIES (HCC): Primary | ICD-10-CM

## 2025-01-08 ENCOUNTER — OFFICE VISIT (OUTPATIENT)
Dept: INTERNAL MEDICINE CLINIC | Facility: CLINIC | Age: 35
End: 2025-01-08
Payer: COMMERCIAL

## 2025-01-08 VITALS
HEART RATE: 86 BPM | DIASTOLIC BLOOD PRESSURE: 74 MMHG | SYSTOLIC BLOOD PRESSURE: 122 MMHG | BODY MASS INDEX: 41.72 KG/M2 | WEIGHT: 298 LBS | RESPIRATION RATE: 16 BRPM | HEIGHT: 71 IN

## 2025-01-08 DIAGNOSIS — E66.01 MORBID (SEVERE) OBESITY DUE TO EXCESS CALORIES (HCC): ICD-10-CM

## 2025-01-08 DIAGNOSIS — I10 ESSENTIAL HYPERTENSION: ICD-10-CM

## 2025-01-08 DIAGNOSIS — Z51.81 THERAPEUTIC DRUG MONITORING: Primary | ICD-10-CM

## 2025-01-08 DIAGNOSIS — F41.1 GENERALIZED ANXIETY DISORDER: ICD-10-CM

## 2025-01-08 DIAGNOSIS — G47.33 OSA ON CPAP: ICD-10-CM

## 2025-01-08 DIAGNOSIS — R73.03 PREDIABETES: ICD-10-CM

## 2025-01-08 PROCEDURE — 99214 OFFICE O/P EST MOD 30 MIN: CPT | Performed by: NURSE PRACTITIONER

## 2025-01-08 NOTE — PATIENT INSTRUCTIONS
Next steps:  1.  Fill your prescribed medication and take as discussed and prescribed: wegovy 2.4mg weekly, metformin, and ajs-pep  2.  Schedule a personal nutrition consultation with one of our registered dieticians     Please try to work on the following dietary changes:  Daily protein recommendation to start:  grams  Daily carbohydrate: <190  Daily calories: 2,200  1.  Drink water with meals and throughout the day, cut down on soda and/or juice if consumed. Consider flavored water options like Bubbly, Spindrift, Hint and Chris.  2.  Eat breakfast daily and focus on having protein with each meal, examples include: greek yogurt, cottage cheese, hard boiled egg, whole grain toast with peanut butter.   3.  Reduce refined carbohydrates and sugars which includes items such as sweets, as well as rice, pasta, and bread and make sure to choose whole grain options when having them with just 1 serving per meal about the size of your inner palm.  4.  Consume non starchy veggies daily working towards making them a good 50% of your daily food intake. Add them to lunch and dinner consistently.  5.  Start a daily probiotic: VSL#3 is recommended, (order on line at www.vsl3.com). Take 1 capsule daily with water for 30 days, then reduce to 1 every other day (this will reduce the cost). Capsules can be left out for 2 weeks, but then must be refrigerated.      Please download torsten My Fitness Pal, LoseIt! Or Net Diary to monitor daily dietary intake and you will be able to see if you are eating the right amount of calories or too much or too little which would hinder weight loss. Additionally this will help to see your daily carbohydrate and protein intake. When you set the torsten up choose 1-2 lbs/week as a goal.  Keeping a paper food journal is an option as well to remain accountable for your choices- this is the start to mindful eating! A low calorie diet has been consistently shown to support weight loss.     Continue or start  exercising to help establish a routine. If not already exercising begin with 1 day and progress as able with long-term goal of 30 minutes 5 days a week at a minimum.     Meditation daily can help manage and control stress. Chronic stress can make weight loss difficult.  Exercising is one way to help with stress, but meditation using the CALM John or another comparable alternative can be done in your home or place of work with little time commitment. This John can also help work on behavior change and improve sleep. Check out the segment under Calm Masterclass and listen to The 4 Pillars of Health. A great way to begin learning about the foundation of lifestyle with practical tips to use in your every day.     Check out www.yourweightmatters.org blog for continued daily support and education along this weight loss journey!    Patient Resources:     Personal Training/Fitness Classes/Health Coaching     [facility] @ [email or web address]-[email or web address]-center Full fitness center with group fitness and personal training. Discount available as client of [facility].  Health Coaching and Personal Training with Emy Amin at our [facility]- individual weekly coaching with option to add personal training and small group fitness classes targeted at weight loss- [phone] and/or email @ [email or web address]  360FIT [place] http://[email or web address] Networks. Group Fitness [phone] and/or email Angely at [email or web address]  [facility] [place] Fitness Centers with multiple locations: Adform (www.Ferfics), Eat The Frog Fitness (www.Morris Fre[employer] and Transport Brokerage.Cymtec Systems), Fit Body Bootcamp (www.Telecom Transport ManagementbodybootNuConomyp.Cymtec Systems), Intuitive Biosciences Fitness (www.CSMG.Cymtec Systems), The Exercise  (www.exercisecoach.Cymtec Systems)     Online Fitness  Fitness  on Luristic  Fit in 10 DVD series- www.boabs45CAD.com  Sit and Be Fit - Chair exercise series  Www.sitandbefit.org  Hip Hop Fit with Anthony Berry at www.hiphopfit.net     Apps for on the Go Fitness  SpeechTrans 7 Minute Workout (orange box with white 7) - free on the go HIIT training john  Peloton John @ www.onepeloton.com     Nutrition Trackers and Tools  LoseIT! And My Fitness Pal apps and on line for tracking nutrition  NOOM - virtual health coaching  FitFoundation (healthy meals on the go) in [place] @ www.bhuysymcegszz7rRebellion Media Group  Mayelin MADRID @ wwwPostabonbistromd.com and Saczny43 (keto and low carb plans recommended) @ www.xfsixh88.com, Metabolic Meals @ www.MyMetabolicMeals.com - individual prepared meals to go  Gobble, Blue Apron, Home , Every Plate, Sunbasket- on line meal delivery programs for preparation at home  Meal Village in [place] for homemade meals to go @ www.mealMediaXstreamage.EarLens  Diet Doctor @ www.dietdoctor.EarLens - low carb swaps  Yummly - meal prep and planning john (www.yummly.com)     Stress Management/Behavior/Mindful Eating  CALM meditation john (www.calm.com)  Headspace  Am I Hungry? Mindful eating virtual  john  Www.yourweightmatters.org - Obesity Action Coalition sponsored Blog posts daily  Motivation john (black box with white \")- daily supportive messages sent to your phone     Books/Video Education/Podcasts  Mindless Eating by Salvador Arias  Why We Get Sick by Jose Modi (a book about insulin resistance)  Atomic Habits by Manav Herrera (a book about taking small steps to promote greater behavior change)   Can't Hurt Me by Donis Mcintosh (a book exploring the power of discipline in achieving your goals)  The End of Dieting: How to Live for Life by Dr. Nicola Fitch M.D. or listen to The GroupSpaces Podcast Episode 63: Understanding \"Nutritarian\" Eating w/Dr. Nicola Fitch  Your Body in Balance: The New Science of Food, Hormones, and Health by Dr. Pastor Fine  The Menopause Diet Plan by Lizette Fierro and Gay Mcnamara  The Complete Guide to fasting by Dr. Justice  Sugar, Salt & Fat by Mariela  Jodie, Ph.D, R.D.  Weight Loss Surgery Will Not Treat Food Addiction by Selena Castillo Ph.D  The Game Changers- Coullix Documentary on plant based nutrition  Fed Up - documentary about obesity (Free on Utube)  The Truth About Sugar - documentary on sugar (Free on Utube, https://youtu.be/3L2zuehHS8p)  The Dr. Chung T5 Wellness Plan by Dr. Tim Chung MD  Fitlosophy Fitspiration - journal to better health (found at [employer] in fitness aisle)  What Happened to You?- a look at the impact trauma has on behavior written by Ryan Beckwith and Dr. Mohsen Eagle  Whole Again by Juan He - discovering your true self after trauma  Roseann Viera talk on Tier 1 Performance, The Call to Courage  Podcasts: The Exam Room by the Physician's Committee, Nutrition Facts by Dr. Brady    We are here to support you with weight loss, but please remember that you still need your primary care provider for your routine health maintenance.

## 2025-01-08 NOTE — PROGRESS NOTES
HISTORY OF PRESENT ILLNESS  Chief Complaint   Patient presents with    Weight Check     Down 44     Renzo Stevens is a 34 year old male here for follow up with medical weight loss program for the treatment of overweight, obesity, or morbid obesity.     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 + tracking reports (abdoulayemoter )   Meals out per week on average: 1-2  Stress is manageable   Sleep: 7 hours/night, waking up feeling rested    Denies chest pain, shortness of breath, dizziness, blurred vision, headache, paresthesia, nausea/vomiting.     Breakfast Lunch Dinner Snacks Fluids   Reviewed              Wt Readings from Last 6 Encounters:   01/08/25 298 lb (135.2 kg)   08/21/24 (!) 342 lb (155.1 kg)   07/25/24 (!) 357 lb (161.9 kg)   07/15/24 (!) 360 lb 3.2 oz (163.4 kg)   05/03/24 (!) 360 lb 12.8 oz (163.7 kg)   06/14/23 (!) 333 lb (151 kg)          REVIEW OF SYSTEMS  GENERAL: feels well otherwise, denied any fevers chills or night sweats   LUNGS: denies shortness of breath  CARDIOVASCULAR: denies chest pain  GI: denies abdominal pain  MUSCULOSKELETAL: denies back pain, joint pains   PSYCH: denies change in behavior or mood, denies feeling sad or depressed    EXAM  /74   Pulse 86   Resp 16   Ht 5' 11\" (1.803 m)   Wt 298 lb (135.2 kg)   BMI 41.56 kg/m²       GENERAL: well developed, well nourished, in no apparent distress, A/O x3  SKIN: no rashes, no suspicious lesions  HEENT: atraumatic, normocephalic, OP-clear, PERRLA  NECK:  supple, no adenopathy  LUNGS: CTA in all fields, breathing non labored  CARDIO: RRR without murmur  GI: +BS, NT/ND, no masses or HSM  EXTREMITIES: no cyanosis, no clubbing, no edema    Lab Results   Component Value Date     (H) 07/13/2024    BUN 11 07/13/2024    CREATSERUM 0.72 07/13/2024    ANIONGAP 10 07/13/2024    CA 9.0 07/13/2024    OSMOCALC 294 07/13/2024    ALKPHO 49 07/13/2024    AST 30 07/13/2024    ALT 73 (H) 07/13/2024    BILT 0.3 07/13/2024    TP 7.2 07/13/2024    ALB 3.9 07/13/2024    GLOBULIN 3.3 07/13/2024     07/13/2024    K 3.8 07/13/2024     07/13/2024    CO2 23.0 07/13/2024     Lab Results   Component Value Date     01/27/2023    A1C 5.7 (H) 01/27/2023     Lab Results   Component Value Date    CHOLEST 159 01/27/2023    TRIG 114 01/27/2023    HDL 43 01/27/2023    LDL 95 01/27/2023    VLDL 19 01/27/2023    NONHDLC 116 01/27/2023     Lab Results   Component Value Date    B12 478 07/13/2024     No results found for: \"VITD\", \"QVITD\", \"DXTW05LP\"    Medications Ordered Prior to Encounter[1]    ASSESSMENT/PLAN    ICD-10-CM    1. Therapeutic drug monitoring  Z51.81       2. Morbid (severe) obesity due to excess calories (HCC)  E66.01       3. Essential hypertension  I10       4. Generalized anxiety disorder  F41.1       5. CHARU on CPAP  G47.33       6. Prediabetes  R73.03           PLAN   Initial Weight Data and Goal Weight Loss:  Initial consult: #325 lbs on 7/2021  Weight Calculations  Initial Weight: 325 lbs  Initial Weight Date: 07/01/21  Today's Weight: 298 lbs  5% Goal: 16.25  10% Goal: 32.5  Total Weight Loss: 27 lbs  Total weight loss: Down 44 lbs total, Net loss 27 lbs  Will increase medications: wegovy 2.4mg weekly   Continue with medication: metformin 500mg bid   Continue with medication: jas-pep   --advised of side effects and adverse effects of this medication  Contradictions: none  Reviewed labs  Continue with vitamin d OTC   Anxiety, stable  HTN  blood pressure is in  office is stable - continue present management  Prediabetes, last A1c 5.7% on 1/2023- will continue metformin   CHARU- on cpap machine   HTN, stable, managed by PCP   Nutrition: Low carb diet, recommended to eat breakfast daily/ regular protein intake  Follow up with dietitian and psychologist as recommended.  Discussed the role of sleep and stress in weight management.  Counseled on comprehensive weight loss plan including attention to nutrition, exercise and behavior/stress management for success. See patient instruction below for more details.  Discussed strategies to overcome barriers to successful weight loss and weight maintenance  FITTE: ACSM recommendations (150-300 minutes/ week in active weight loss)   Weight Loss Consent to treat reviewed and signed.    Total time spent on chart review, pre-charting, obtaining history, counseling, and educating, reviewing labs was 30 minutes.       NOTE TO PATIENT: The 21st Century Cures Act makes clinical notes like these available to patients in the interest of transparency. Clinical notes are medical documents used by physicians and care providers to communicate with each other. These documents include medical language and terminology, abbreviations, and treatment information that may sound technical and at times possibly unfamiliar. In addition, at times, the verbiage may appear blunt or direct. These documents are one tool providers use to communicate relevant information and clinical opinions of the care providers in a way that allows common understanding of the clinical context.     There are no Patient Instructions on file for this visit.    No follow-ups on file.    Patient verbalizes understanding.    MADHU Fagan             [1]   Current Outpatient Medications on File Prior to Visit   Medication Sig Dispense Refill    semaglutide-weight management 2.4 MG/0.75ML Subcutaneous Solution Auto-injector Inject 0.75 mL (2.4 mg total) into the skin once a  week. 3 mL 2    Pancrelipase, Lip-Prot-Amyl, (ZENPEP) 08574-536801 units Oral Cap DR Particles Take 60,000 Units by mouth 3 (three) times daily with meals. 270 capsule 1    Pancrelipase, Lip-Prot-Amyl, (ZENPEP) 55342-803221 units Oral Cap DR Particles Take 40,000 Units by mouth 3 (three) times daily with meals. 48 capsule 0    metFORMIN  MG Oral Tablet 24 Hr Take 1 tablet (500 mg total) by mouth 2 (two) times daily with meals. 60 tablet 2    cefuroxime 250 MG Oral Tab Take 1 tablet (250 mg total) by mouth 2 (two) times daily. 20 tablet 0    amLODIPine 10 MG Oral Tab Take 1 tablet (10 mg total) by mouth daily. 90 tablet 1    valsartan 160 MG Oral Tab Take 1 tablet (160 mg total) by mouth daily. 90 tablet 1    ondansetron 4 MG Oral Tablet Dispersible Take 1 tablet (4 mg total) by mouth every 6 (six) hours as needed. 10 tablet 0    Melatonin 10 MG Oral Tab Take 1 tablet by mouth nightly as needed.       No current facility-administered medications on file prior to visit.

## 2025-01-15 ENCOUNTER — PATIENT MESSAGE (OUTPATIENT)
Dept: INTERNAL MEDICINE CLINIC | Facility: CLINIC | Age: 35
End: 2025-01-15

## 2025-01-15 NOTE — TELEPHONE ENCOUNTER
Denies angina or dyspnea  Continue GDMT:  Aspirin  Atorvastatin  Losartan  Metoprolol succinate   Seen 1/8/25  On wegovy and metformin with jas pep

## 2025-01-25 PROBLEM — F45.22 BODY DYSMORPHIC DISORDER: Status: ACTIVE | Noted: 2025-01-25

## 2025-01-26 DIAGNOSIS — I10 ESSENTIAL HYPERTENSION: ICD-10-CM

## 2025-01-28 RX ORDER — AMLODIPINE BESYLATE 10 MG/1
10 TABLET ORAL DAILY
Qty: 90 TABLET | Refills: 0 | Status: SHIPPED | OUTPATIENT
Start: 2025-01-28

## 2025-01-28 RX ORDER — VALSARTAN 160 MG/1
160 TABLET ORAL DAILY
Qty: 90 TABLET | Refills: 0 | Status: SHIPPED | OUTPATIENT
Start: 2025-01-28

## 2025-01-28 NOTE — TELEPHONE ENCOUNTER
Requested Prescriptions     Pending Prescriptions Disp Refills    amLODIPine 10 MG Oral Tab 90 tablet 1     Sig: Take 1 tablet (10 mg total) by mouth daily.    valsartan 160 MG Oral Tab 90 tablet 1     Sig: Take 1 tablet (160 mg total) by mouth daily.     LOV 7/15/2024     Patient was asked to follow-up in: Follow-up not documented in note    Appointment scheduled: Visit date not found     Medication refilled per protocol.

## 2025-03-08 PROBLEM — F43.10 POSTTRAUMATIC STRESS DISORDER WITH DISSOCIATIVE SYMPTOMS: Status: ACTIVE | Noted: 2025-03-08

## 2025-03-12 ENCOUNTER — PATIENT MESSAGE (OUTPATIENT)
Dept: INTERNAL MEDICINE CLINIC | Facility: CLINIC | Age: 35
End: 2025-03-12

## 2025-03-12 NOTE — TELEPHONE ENCOUNTER
Last visit 1/8/25  Future Appointments   Date Time Provider Department Center   3/18/2025  1:00 PM Lucille Martinez PsyD [facility]   4/1/2025  1:00 PM Lucille Martinez PsyD [facility]   4/14/2025 10:20 AM Cele Grande APRN [facility] [address]   7/21/2025 11:20 AM Cele Grande APRN [facility]

## 2025-03-20 ENCOUNTER — TELEPHONE (OUTPATIENT)
Dept: FAMILY MEDICINE CLINIC | Facility: CLINIC | Age: 35
End: 2025-03-20

## 2025-03-20 DIAGNOSIS — Z00.00 ROUTINE HEALTH MAINTENANCE: Primary | ICD-10-CM

## 2025-03-20 NOTE — TELEPHONE ENCOUNTER
Please enter lab orders for the patient's upcoming physical appointment.     Physical scheduled:   Your appointments       Date & Time Appointment Department ([place])    Apr 22, 2025 3:20 PM CDT Adult Physical with Samantha Austin DO [facility], [facility] ([facility])    PLEASE NOTE - Most insurances allow a Complete Physical once every 366 days. Please schedule accordingly.    Please arrive 15 minutes prior to your scheduled appointment. Please also bring your Insurance card, Photo ID, and your medication bottles or a list of your current medication.    If you no longer require this appointment, please contact your physician office to cancel.              [facility], [facility] [address]  [address] [place] [id number]  [phone]           Preferred lab: [facility] ([facility])     The patient has been notified to complete fasting labs prior to their physical appointment.

## 2025-04-02 DIAGNOSIS — E66.01 MORBID (SEVERE) OBESITY DUE TO EXCESS CALORIES (HCC): ICD-10-CM

## 2025-04-02 NOTE — TELEPHONE ENCOUNTER
Requesting   Requested Prescriptions     Pending Prescriptions Disp Refills    WEGOVY 2.4 MG/0.75ML Subcutaneous Solution Auto-injector [Pharmacy Med Name: WEGOVY 2.4 MG/0.75 ML PEN]  2     Sig: INJECT 0.75ML INTO SKIN ONCE WEEKLY      LOV: 01/08/25  RTC: 3-6mo  Last Relevant Labs:   Filled: 01/06/25 #3ml with 2 refills    Future Appointments   Date Time Provider Department Center   4/10/2025 12:30 PM Qumaryan, Lucille, PsyD [facility]   4/14/2025 10:20 AM Cele Grande APRN [facility] [address]   4/15/2025 12:00 PM Quistad, Lucille, PsyD [facility]   4/22/2025  3:20 PM Samantha Austin, DO [facility]   4/23/2025  1:00 PM Quistad, Lucille, PsyD [facility]   4/29/2025  1:00 PM Quistad, Lucille, PsyD [facility]   5/6/2025  1:00 PM Quistad, Lucille, PsyD [facility]   5/13/2025  1:00 PM Quistad, Lucille, PsyD [facility]   7/21/2025 11:20 AM Cele Grande APRN [facility]

## 2025-04-03 RX ORDER — SEMAGLUTIDE 2.4 MG/.75ML
INJECTION, SOLUTION SUBCUTANEOUS
Qty: 3 ML | Refills: 2 | Status: SHIPPED | OUTPATIENT
Start: 2025-04-03

## 2025-04-14 ENCOUNTER — OFFICE VISIT (OUTPATIENT)
Dept: INTERNAL MEDICINE CLINIC | Facility: CLINIC | Age: 35
End: 2025-04-14
Payer: COMMERCIAL

## 2025-04-14 VITALS
BODY MASS INDEX: 40.46 KG/M2 | HEIGHT: 71 IN | HEART RATE: 72 BPM | OXYGEN SATURATION: 98 % | DIASTOLIC BLOOD PRESSURE: 78 MMHG | RESPIRATION RATE: 18 BRPM | SYSTOLIC BLOOD PRESSURE: 126 MMHG | WEIGHT: 289 LBS

## 2025-04-14 DIAGNOSIS — I10 ESSENTIAL HYPERTENSION: ICD-10-CM

## 2025-04-14 DIAGNOSIS — G47.33 OSA ON CPAP: ICD-10-CM

## 2025-04-14 DIAGNOSIS — E66.01 MORBID (SEVERE) OBESITY DUE TO EXCESS CALORIES (HCC): ICD-10-CM

## 2025-04-14 DIAGNOSIS — R73.03 PREDIABETES: ICD-10-CM

## 2025-04-14 DIAGNOSIS — Z51.81 THERAPEUTIC DRUG MONITORING: Primary | ICD-10-CM

## 2025-04-14 DIAGNOSIS — F41.1 GENERALIZED ANXIETY DISORDER: ICD-10-CM

## 2025-04-14 PROCEDURE — 99214 OFFICE O/P EST MOD 30 MIN: CPT | Performed by: NURSE PRACTITIONER

## 2025-04-14 NOTE — PROGRESS NOTES
HISTORY OF PRESENT ILLNESS  Chief Complaint   Patient presents with    Weight Check     Down 9 lb(1/08/25)     Renzo Stevens is a 34 year old male here for follow up with medical weight loss program for the treatment of overweight, obesity, or morbid obesity.     Down 9 lbs (follow-up from 1/2025)  Compliant on wegovy 2.4mg weekly, jas-pep  Tolerating well, helping with decreasing appetite and no side effects     Has added in strength training 200 hours+ per week  Has marathon next weekend - ran 20,000 miles this past weekend   Exercise/Activity: 6x/ week, via walking/running (5K)   Nutrition: eating regular meals, +protein, minimal veggies. 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     Breakfast Lunch Dinner Snacks Fluids   Reviewed              Wt Readings from Last 6 Encounters:   04/14/25 289 lb (131.1 kg)   01/08/25 298 lb (135.2 kg)   08/21/24 (!) 342 lb (155.1 kg)   07/25/24 (!) 357 lb (161.9 kg)   07/15/24 (!) 360 lb 3.2 oz (163.4 kg)   05/03/24 (!) 360 lb 12.8 oz (163.7 kg)          REVIEW OF SYSTEMS  GENERAL: feels well otherwise, denied any fevers chills or night sweats   LUNGS: denies shortness of breath  CARDIOVASCULAR: denies chest pain  GI: denies abdominal pain  MUSCULOSKELETAL: denies back pain, joint pains   PSYCH: denies change in behavior or mood, denies feeling sad or depressed    EXAM  /78   Pulse 72   Resp 18   Ht 5' 11\" (1.803 m)   Wt 289 lb (131.1 kg)   SpO2 98%   BMI 40.31 kg/m²       GENERAL: well developed, well nourished, in no apparent distress, A/O x3  SKIN: no rashes, no suspicious lesions  HEENT: atraumatic, normocephalic, OP-clear, PERRLA  NECK: supple, no adenopathy  LUNGS: CTA in all fields, breathing non labored  CARDIO: RRR without murmur  GI: +BS,  NT/ND, no masses or HSM  EXTREMITIES: no cyanosis, no clubbing, no edema    Lab Results   Component Value Date     (H) 07/13/2024    BUN 11 07/13/2024    CREATSERUM 0.72 07/13/2024    ANIONGAP 10 07/13/2024    CA 9.0 07/13/2024    OSMOCALC 294 07/13/2024    ALKPHO 49 07/13/2024    AST 30 07/13/2024    ALT 73 (H) 07/13/2024    BILT 0.3 07/13/2024    TP 7.2 07/13/2024    ALB 3.9 07/13/2024    GLOBULIN 3.3 07/13/2024     07/13/2024    K 3.8 07/13/2024     07/13/2024    CO2 23.0 07/13/2024     Lab Results   Component Value Date     01/27/2023    A1C 5.7 (H) 01/27/2023     Lab Results   Component Value Date    CHOLEST 159 01/27/2023    TRIG 114 01/27/2023    HDL 43 01/27/2023    LDL 95 01/27/2023    VLDL 19 01/27/2023    NONHDLC 116 01/27/2023     Lab Results   Component Value Date    B12 478 07/13/2024     No results found for: \"VITD\", \"QVITD\", \"TSVJ60KI\"    Medications Ordered Prior to Encounter[1]    ASSESSMENT/PLAN    ICD-10-CM    1. Therapeutic drug monitoring  Z51.81       2. Morbid (severe) obesity due to excess calories (HCC)  E66.01       3. CHARU on CPAP  G47.33       4. Generalized anxiety disorder  F41.1       5. Essential hypertension  I10       6. Prediabetes  R73.03           PLAN   Initial Weight Data and Goal Weight Loss:  Initial consult: # 325lbs on 7/2021  Weight Calculations  Initial Weight: 325 lbs  Initial Weight Date: 07/01/21  Today's Weight: 289 lbs  5% Goal: 16.25  10% Goal: 32.5  Total Weight Loss: 36 lbs  Total weight loss: Down 9 lbs total, Net loss 36 lbs  Continue with medications: wegovy 2.4mg weekly -unchanged can possibly look into Zepbound if not seeing any change in weight  Continue with medication: jas-pep   --advised of side effects and adverse effects of this medication  Contradictions: metformin  Reviewed labs-will be seeing PCP soon for physical exam  Repeated body composition: body fat 35.6%, visceral fat is 24, muscle mass is 24.5 kg  Continue with  vitamin d OTC   Anxiety, stable  HTN  blood pressure is in office is stable - continue present management  Prediabetes, last A1c 5.7% on 1/2023  CHARU- on cpap machine   HTN, stable, managed by PCP   Wrote out macros and encouraged tracking food  Nutrition: Low carb diet, recommended to eat breakfast daily/ regular protein intake  Follow up with dietitian and psychologist as recommended.  Discussed the role of sleep and stress in weight management.  Counseled on comprehensive weight loss plan including attention to nutrition, exercise and behavior/stress management for success. See patient instruction below for more details.  Discussed strategies to overcome barriers to successful weight loss and weight maintenance  FITTE: ACSM recommendations (150-300 minutes/ week in active weight loss)   Weight Loss Consent to treat reviewed and signed.    Total time spent on chart review, pre-charting, obtaining history, counseling, and educating, reviewing labs was 30 minutes.       NOTE TO PATIENT: The 21st Century Cures Act makes clinical notes like these available to patients in the interest of transparency. Clinical notes are medical documents used by physicians and care providers to communicate with each other. These documents include medical language and terminology, abbreviations, and treatment information that may sound technical and at times possibly unfamiliar. In addition, at times, the verbiage may appear blunt or direct. These documents are one tool providers use to communicate relevant information and clinical opinions of the care providers in a way that allows common understanding of the clinical context.     There are no Patient Instructions on file for this visit.    No follow-ups on file.    Patient verbalizes understanding.    MADHU Fagan             [1]   Current Outpatient Medications on File Prior to Visit   Medication Sig Dispense Refill    semaglutide-weight management (WEGOVY) 2.4 MG/0.75ML  Subcutaneous Solution Auto-injector INJECT 0.75ML INTO SKIN ONCE WEEKLY 3 mL 2    amLODIPine 10 MG Oral Tab Take 1 tablet (10 mg total) by mouth daily. 90 tablet 0    valsartan 160 MG Oral Tab Take 1 tablet (160 mg total) by mouth daily. 90 tablet 0    Pancrelipase, Lip-Prot-Amyl, (ZENPEP) 43480-250794 units Oral Cap DR Particles Take 60,000 Units by mouth 3 (three) times daily with meals. 270 capsule 1    Pancrelipase, Lip-Prot-Amyl, (ZENPEP) 26925-540663 units Oral Cap DR Particles Take 40,000 Units by mouth 3 (three) times daily with meals. (Patient not taking: Reported on 4/14/2025) 48 capsule 0    metFORMIN  MG Oral Tablet 24 Hr Take 1 tablet (500 mg total) by mouth 2 (two) times daily with meals. (Patient not taking: Reported on 4/14/2025) 60 tablet 2    cefuroxime 250 MG Oral Tab Take 1 tablet (250 mg total) by mouth 2 (two) times daily. (Patient not taking: Reported on 4/14/2025) 20 tablet 0    ondansetron 4 MG Oral Tablet Dispersible Take 1 tablet (4 mg total) by mouth every 6 (six) hours as needed. 10 tablet 0    Melatonin 10 MG Oral Tab Take 1 tablet by mouth nightly as needed. (Patient not taking: Reported on 4/14/2025)       No current facility-administered medications on file prior to visit.

## 2025-04-14 NOTE — PATIENT INSTRUCTIONS
Next steps:  1.  Fill your prescribed medication and take as discussed and prescribed: wegovy 2.4mg weekly can think of calling insurance to see if Zepbound would be covered    2.  Schedule a personal nutrition consultation with one of our registered dieticians     Please try to work on the following dietary changes:  Daily protein recommendation to start:  grams  Daily carbohydrate: <180g  Daily calories: 2,100-2,200  1.  Drink water with meals and throughout the day, cut down on soda and/or juice if consumed. Consider flavored water options like Bubbly, Spindrift, Hint and Chris.  2.  Eat breakfast daily and focus on having protein with each meal, examples include: greek yogurt, cottage cheese, hard boiled egg, whole grain toast with peanut butter.   3.  Reduce refined carbohydrates and sugars which includes items such as sweets, as well as rice, pasta, and bread and make sure to choose whole grain options when having them with just 1 serving per meal about the size of your inner palm.  4.  Consume non starchy veggies daily working towards making them a good 50% of your daily food intake. Add them to lunch and dinner consistently.  5.  Start a daily probiotic: VSL#3 is recommended, (order on line at www.vsl3.com). Take 1 capsule daily with water for 30 days, then reduce to 1 every other day (this will reduce the cost). Capsules can be left out for 2 weeks, but then must be refrigerated.      Please download torsten My Fitness Pal, LoseIt! Or Net Diary to monitor daily dietary intake and you will be able to see if you are eating the right amount of calories or too much or too little which would hinder weight loss. Additionally this will help to see your daily carbohydrate and protein intake. When you set the torsten up choose 1-2 lbs/week as a goal.  Keeping a paper food journal is an option as well to remain accountable for your choices- this is the start to mindful eating! A low calorie diet has been consistently  shown to support weight loss.     Continue or start exercising to help establish a routine. If not already exercising begin with 1 day and progress as able with long-term goal of 30 minutes 5 days a week at a minimum.     Meditation daily can help manage and control stress. Chronic stress can make weight loss difficult.  Exercising is one way to help with stress, but meditation using the CALM John or another comparable alternative can be done in your home or place of work with little time commitment. This John can also help work on behavior change and improve sleep. Check out the segment under Calm Masterclass and listen to The 4 Pillars of Health. A great way to begin learning about the foundation of lifestyle with practical tips to use in your every day.     Check out www.yourweightmatters.org blog for continued daily support and education along this weight loss journey!    Patient Resources:     Personal Training/Fitness Classes/Health Coaching     [facility] @ [email or web address] Full fitness center with group fitness and personal training. Discount available as client of [facility].  Health Coaching and Personal Training with Emy Amin at our [place] [facility]- individual weekly coaching with option to add personal training and small group fitness classes targeted at weight loss- [phone] and/or email @ [email or web address]  [employer] [place] [email or web address] Group Fitness [phone] and/or email Angely at [email or web address]  [facility] Fitness Centers with multiple locations: MuleSoft (www.BeiBei), Eat The Frog Fitness (www.Visuu.Propers), Fit Body Bootcamp (www.Criers Podiumbodybootcamp.com), Spoonfed Fitness (www.Katuah Market.Propers), The Exercise  (www.exercisecoach.Propers)     Online Fitness  Fitness  on Instabank  Fit in 10 DVD series-  www.tjdnz39AGF.Nexus Biosystems  Sit and Be Fit - Chair exercise series Www.sitandbefit.org  Hip Hop Fit with Anthony Berry at www.hiphopfit.net     Apps for on the Go Fitness  [place] 7 Minute Workout (orange box with white 7) - free on the go HIIT training john  Peloton John @ www.onepeloton.com     Nutrition Trackers and Tools  LoseIT! And My Fitness Pal apps and on line for tracking nutrition  NOOM - virtual health coaching  FitFoundation (healthy meals on the go) in [place] @ www.soxxjcukgmmto1qFamilySkyline  Bismeredith MADRID @ wwwNutricatebistromd.com and Wjvgcv84 (keto and low carb plans recommended) @ www.lvcagy14.com, Metabolic Meals @ www.MyMetabolicMeals.com - individual prepared meals to go  Gobble, Blue Apron, Home , Every Plate, Sunbasket- on line meal delivery programs for preparation at home  [place] in [place] for homemade meals to go @ wwwNutricatemeale-Tag  Diet Doctor @ www.dietdoctor.com - low carb swaps  Yummly - meal prep and planning john (www.yummly.com)     Stress Management/Behavior/Mindful Eating  CALM meditation john (www.calm.com)  Headspace  Am I Hungry? Mindful eating virtual  john  Www.yourweightmatters.org - Obesity Action Coalition sponsored Blog posts daily  Motivation john (black box with white \")- daily supportive messages sent to your phone     Books/Video Education/Podcasts  Mindless Eating by Salvador Arias  Why We Get Sick by Jose Modi (a book about insulin resistance)  Atomic Habits by Manav Herrera (a book about taking small steps to promote greater behavior change)   Can't Hurt Me by Donis Mcintosh (a book exploring the power of discipline in achieving your goals)  The End of Dieting: How to Live for Life by Dr. Nicola Fitch M.D. or listen to [employer] Podcast Episode 63: Understanding \"Nutritarian\" Eating w/Dr. Nicola Fitch  Your Body in Balance: The New Science of Food, Hormones, and Health by Dr. Patsor Fine  The Menopause Diet Plan by Lizette Fierro and Gay Mcnamara  The Complete  Guide to fasting by Dr. Justice  Sugar, Salt & Fat by Mariela Feliciano, Ph.D, R.D.  Weight Loss Surgery Will Not Treat Food Addiction by Selena Castillo Ph.D  The Game Changers- Netflix Documentary on plant based nutrition  Fed Up - documentary about obesity (Free on Utube)  The Truth About Sugar - documentary on sugar (Free on Utube, https://youtu.be/3T2fhmgJS0a)  The Dr. Chung T5 Wellness Plan by Dr. Tim Chung MD  Fitlosophy Fitspiration - journal to better health (found at [employer] in fitness aisle)  What Happened to You?- a look at the impact trauma has on behavior written by Ryan Beckwith and Dr. Mohsen Eagle  Whole Again by Juan He - discovering your true self after trauma  Roseann Viera talk on Netflix, The Call to Courage  Podcasts: The Exam Room by the Physician's Committee, Nutrition Facts by Dr. Brady    We are here to support you with weight loss, but please remember that you still need your primary care provider for your routine health maintenance.

## 2025-04-26 ENCOUNTER — PATIENT MESSAGE (OUTPATIENT)
Dept: INTERNAL MEDICINE CLINIC | Facility: CLINIC | Age: 35
End: 2025-04-26

## 2025-04-27 DIAGNOSIS — I10 ESSENTIAL HYPERTENSION: ICD-10-CM

## 2025-04-29 RX ORDER — AMLODIPINE BESYLATE 10 MG/1
10 TABLET ORAL DAILY
Qty: 90 TABLET | Refills: 0 | Status: SHIPPED | OUTPATIENT
Start: 2025-04-29

## 2025-04-29 RX ORDER — VALSARTAN 160 MG/1
160 TABLET ORAL DAILY
Qty: 90 TABLET | Refills: 0 | Status: SHIPPED | OUTPATIENT
Start: 2025-04-29

## 2025-04-29 NOTE — TELEPHONE ENCOUNTER
Requested Prescriptions     Signed Prescriptions Disp Refills    amLODIPine 10 MG Oral Tab 90 tablet 0     Sig: Take 1 tablet (10 mg total) by mouth daily.     Authorizing Provider: CHIQUI SIMS     Ordering User: HUNTER HERNANDEZ    valsartan 160 MG Oral Tab 90 tablet 0     Sig: Take 1 tablet (160 mg total) by mouth daily.     Authorizing Provider: CHIQUI SIMS     Ordering User: HUNTER HERNANDEZ      Refilled per protocol/OV notes

## 2025-05-29 DIAGNOSIS — E66.01 MORBID (SEVERE) OBESITY DUE TO EXCESS CALORIES (HCC): ICD-10-CM

## 2025-05-30 RX ORDER — SEMAGLUTIDE 2.4 MG/.75ML
INJECTION, SOLUTION SUBCUTANEOUS
Qty: 3 ML | Refills: 2 | OUTPATIENT
Start: 2025-05-30

## 2025-06-28 DIAGNOSIS — E66.01 MORBID (SEVERE) OBESITY DUE TO EXCESS CALORIES (HCC): ICD-10-CM

## 2025-06-30 RX ORDER — SEMAGLUTIDE 2.4 MG/.75ML
INJECTION, SOLUTION SUBCUTANEOUS
Qty: 3 ML | Refills: 2 | Status: SHIPPED | OUTPATIENT
Start: 2025-06-30

## 2025-06-30 NOTE — TELEPHONE ENCOUNTER
Requesting   Requested Prescriptions     Refused Prescriptions Disp Refills    WEGOVY 2.4 MG/0.75ML Subcutaneous Solution Auto-injector [Pharmacy Med Name: WEGOVY 2.4 MG/0.75 ML PEN]  2     Sig: INJECT 0.75ML INTO SKIN ONCE WEEKLY       LOV: 4/14/2025  RTC: 7/21/2025  Last Relevant Labs: na  Filled: 4/03/2025 #3 ml with 2 refills    Future Appointments   Date Time Provider Department Center   7/1/2025  2:20 PM Samantha Austin DO [facility]   7/2/2025  2:00 PM Lucille Martinez, PsyD [facility]   7/9/2025  2:00 PM Lucille Martinez PsyD [facility]   7/21/2025 11:20 AM Cele Grande APRN [facility]   11/18/2025  1:00 PM Cele Grande APRN [facility]   2/11/2026  2:00 PM Cele Grande APRN [facility]

## 2025-07-01 ENCOUNTER — OFFICE VISIT (OUTPATIENT)
Dept: FAMILY MEDICINE CLINIC | Facility: CLINIC | Age: 35
End: 2025-07-01
Payer: COMMERCIAL

## 2025-07-01 VITALS
WEIGHT: 299.81 LBS | HEART RATE: 78 BPM | SYSTOLIC BLOOD PRESSURE: 128 MMHG | RESPIRATION RATE: 16 BRPM | BODY MASS INDEX: 42.44 KG/M2 | DIASTOLIC BLOOD PRESSURE: 84 MMHG | OXYGEN SATURATION: 96 % | HEIGHT: 70.47 IN

## 2025-07-01 DIAGNOSIS — K75.81 METABOLIC DYSFUNCTION-ASSOCIATED STEATOHEPATITIS (MASH): ICD-10-CM

## 2025-07-01 DIAGNOSIS — E66.01 MORBID (SEVERE) OBESITY DUE TO EXCESS CALORIES (HCC): ICD-10-CM

## 2025-07-01 DIAGNOSIS — F45.22 BODY DYSMORPHIC DISORDER: ICD-10-CM

## 2025-07-01 DIAGNOSIS — F43.10 POSTTRAUMATIC STRESS DISORDER WITH DISSOCIATIVE SYMPTOMS: ICD-10-CM

## 2025-07-01 DIAGNOSIS — Z00.00 ROUTINE HEALTH MAINTENANCE: Primary | ICD-10-CM

## 2025-07-01 DIAGNOSIS — I10 ESSENTIAL HYPERTENSION: ICD-10-CM

## 2025-07-01 DIAGNOSIS — F41.1 GENERALIZED ANXIETY DISORDER: ICD-10-CM

## 2025-07-01 PROCEDURE — 99214 OFFICE O/P EST MOD 30 MIN: CPT | Performed by: FAMILY MEDICINE

## 2025-07-01 PROCEDURE — 99395 PREV VISIT EST AGE 18-39: CPT | Performed by: FAMILY MEDICINE

## 2025-07-01 RX ORDER — FLUOXETINE 10 MG/1
10 CAPSULE ORAL DAILY
COMMUNITY

## 2025-07-01 RX ORDER — AMLODIPINE BESYLATE 5 MG/1
5 TABLET ORAL DAILY
Qty: 90 TABLET | Refills: 1 | Status: SHIPPED | OUTPATIENT
Start: 2025-07-01

## 2025-07-01 NOTE — PROGRESS NOTES
The following individual(s) verbally consented to be recorded using ambient AI listening technology and understand that they can each withdraw their consent to this listening technology at any point by asking the clinician to turn off or pause the recording:    Patient name: Renzo Stevens
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
## 2025-07-11 ENCOUNTER — PATIENT MESSAGE (OUTPATIENT)
Dept: INTERNAL MEDICINE CLINIC | Facility: CLINIC | Age: 35
End: 2025-07-11

## 2025-07-21 ENCOUNTER — TELEPHONE (OUTPATIENT)
Dept: INTERNAL MEDICINE CLINIC | Facility: CLINIC | Age: 35
End: 2025-07-21

## 2025-07-21 ENCOUNTER — OFFICE VISIT (OUTPATIENT)
Dept: INTERNAL MEDICINE CLINIC | Facility: CLINIC | Age: 35
End: 2025-07-21
Payer: COMMERCIAL

## 2025-07-21 VITALS
RESPIRATION RATE: 18 BRPM | WEIGHT: 294 LBS | BODY MASS INDEX: 41.16 KG/M2 | SYSTOLIC BLOOD PRESSURE: 128 MMHG | DIASTOLIC BLOOD PRESSURE: 82 MMHG | HEART RATE: 70 BPM | OXYGEN SATURATION: 97 % | HEIGHT: 71 IN

## 2025-07-21 DIAGNOSIS — G47.33 OSA ON CPAP: ICD-10-CM

## 2025-07-21 DIAGNOSIS — R73.03 PREDIABETES: ICD-10-CM

## 2025-07-21 DIAGNOSIS — Z51.81 THERAPEUTIC DRUG MONITORING: Primary | ICD-10-CM

## 2025-07-21 DIAGNOSIS — E66.01 MORBID (SEVERE) OBESITY DUE TO EXCESS CALORIES (HCC): ICD-10-CM

## 2025-07-21 DIAGNOSIS — F41.1 GENERALIZED ANXIETY DISORDER: ICD-10-CM

## 2025-07-21 DIAGNOSIS — I10 ESSENTIAL HYPERTENSION: ICD-10-CM

## 2025-07-21 PROCEDURE — 99214 OFFICE O/P EST MOD 30 MIN: CPT | Performed by: NURSE PRACTITIONER

## 2025-07-21 RX ORDER — TIRZEPATIDE 7.5 MG/.5ML
7.5 INJECTION, SOLUTION SUBCUTANEOUS WEEKLY
Qty: 2 ML | Refills: 2 | Status: SHIPPED | OUTPATIENT
Start: 2025-07-21 | End: 2025-08-12

## 2025-07-21 NOTE — PROGRESS NOTES
HISTORY OF PRESENT ILLNESS  Chief Complaint   Patient presents with    Weight Check     Up 5 lb 4/14/25     Renzo Stevens is a 35 year old male here for follow up with medical weight loss program for the treatment of overweight, obesity, or morbid obesity.     Up #5 lbs lbs follow-up from 4/2025  Compliant on wegovy 2.4mg weekly   Tolerating well, helping with decreasing appetite and no side effects     Got down to #278 lbs on home scale (may 27, 2025)  Still working with  (feeling like he is gaining muscle) but is frustrated since the weight keeps increasing  Tracking food- Calories 2300, 150-200g protein   Exercise/Activity: 6x/ week, via walking/running (5K), weight training with   Nutrition: eating regular meals, +protein, minimal veggies. + tracking reports (cromoter )   Meals out per week on average: 1-2  Stress is manageable  Sleep: 7.5 hours/night, waking up feeling rested    Denies chest pain, shortness of breath, dizziness, blurred vision, headache, paresthesia, nausea/vomiting.     Breakfast Lunch Dinner Snacks Fluids   Reviewed              Wt Readings from Last 6 Encounters:   07/21/25 294 lb (133.4 kg)   07/01/25 299 lb 12.8 oz (136 kg)   04/14/25 289 lb (131.1 kg)   01/08/25 298 lb (135.2 kg)   08/21/24 (!) 342 lb (155.1 kg)   07/25/24 (!) 357 lb (161.9 kg)          REVIEW OF SYSTEMS  GENERAL: feels well otherwise, denied any fevers chills or night sweats   LUNGS: denies shortness of breath  CARDIOVASCULAR: denies chest pain  GI: denies abdominal pain  MUSCULOSKELETAL: denies back pain, joint pains   PSYCH: denies change in behavior or mood, denies feeling sad or depressed    EXAM  /82   Pulse 70   Resp 18   Ht 5' 11\" (1.803 m)   Wt 294 lb (133.4 kg)   SpO2 97%   BMI 41.00 kg/m²       GENERAL: well developed, well nourished, in no apparent distress, A/O x3  SKIN: no rashes, no suspicious lesions  HEENT: atraumatic, normocephalic, OP-clear, PERRLA  NECK:  supple, no adenopathy  LUNGS: CTA in all fields, breathing non labored  CARDIO: RRR without murmur  GI: +BS, NT/ND, no masses or HSM  EXTREMITIES: no cyanosis, no clubbing, no edema    Lab Results   Component Value Date     (H) 07/13/2024    BUN 11 07/13/2024    CREATSERUM 0.72 07/13/2024    ANIONGAP 10 07/13/2024    CA 9.0 07/13/2024    OSMOCALC 294 07/13/2024    ALKPHO 49 07/13/2024    AST 30 07/13/2024    ALT 73 (H) 07/13/2024    BILT 0.3 07/13/2024    TP 7.2 07/13/2024    ALB 3.9 07/13/2024    GLOBULIN 3.3 07/13/2024     07/13/2024    K 3.8 07/13/2024     07/13/2024    CO2 23.0 07/13/2024     Lab Results   Component Value Date     01/27/2023    A1C 5.7 (H) 01/27/2023     Lab Results   Component Value Date    CHOLEST 159 01/27/2023    TRIG 114 01/27/2023    HDL 43 01/27/2023    LDL 95 01/27/2023    VLDL 19 01/27/2023    NONHDLC 116 01/27/2023     Lab Results   Component Value Date    B12 478 07/13/2024     No results found for: \"VITD\", \"QVITD\", \"[id number]\"    Medications Ordered Prior to Encounter[1]    ASSESSMENT/PLAN    ICD-10-CM    1. Therapeutic drug monitoring  Z51.81       2. Morbid (severe) obesity due to excess calories (HCC)  E66.01       3. CHARU on CPAP  G47.33       4. Generalized anxiety disorder  F41.1       5. Essential hypertension  I10       6. Prediabetes  R73.03           PLAN   Initial Weight Data and Goal Weight Loss:  Initial consult: #325 lbs on 7/2021  Weight Calculations  Initial Weight: 325 lbs  Initial Weight Date: 07/01/21  Today's Weight: 294 lbs  5% Goal: 16.25  10% Goal: 32.5  Total Weight Loss: 31 lbs  Total weight loss: up #5 lbs total, Net loss 31 lbs  Will stop medications: wegovy 2.4mg weekly (since not seeing a lot of weight loss)  Will trial medication: zepbound 7.5mg weekly x 4 weeks   Continue with medication: jas-pep (as needed)   --advised of side effects and adverse effects of this medication  --Instructed to use contraception at all times while on  AOMs  Contradictions: metformin   Reviewed labs-will be seeing PCP soon for physical exam  Repeated body composition: body fat 36.2%, visceral fat is 25, muscle mass is 24.9 kg  Continue with vitamin d OTC   Anxiety, stable  HTN  blood pressure is in office is stable - continue present management  Prediabetes, last A1c 5.7% on 1/2023  CHARU- on cpap machine   HTN, stable, managed by PCP   Wrote out macros and encouraged tracking food  Nutrition: Low carb diet, recommended to eat breakfast daily/ regular protein intake  Follow up with dietitian and psychologist as recommended.  Discussed the role of sleep and stress in weight management.  Counseled on comprehensive weight loss plan including attention to nutrition, exercise and behavior/stress management for success. See patient instruction below for more details.  Discussed strategies to overcome barriers to successful weight loss and weight maintenance  FITTE: ACSM recommendations (150-300 minutes/ week in active weight loss)   Weight Loss Consent to treat reviewed and signed.    Total time spent on chart review, pre-charting, obtaining history, counseling, and educating, reviewing labs was 30 minutes.       NOTE TO PATIENT: The 21st Century Cures Act makes clinical notes like these available to patients in the interest of transparency. Clinical notes are medical documents used by physicians and care providers to communicate with each other. These documents include medical language and terminology, abbreviations, and treatment information that may sound technical and at times possibly unfamiliar. In addition, at times, the verbiage may appear blunt or direct. These documents are one tool providers use to communicate relevant information and clinical opinions of the care providers in a way that allows common understanding of the clinical context.     There are no Patient Instructions on file for this visit.    No follow-ups on file.    Patient verbalizes  understanding.    Cele Grande, MADHU             [1]   Current Outpatient Medications on File Prior to Visit   Medication Sig Dispense Refill    FLUoxetine 10 MG Oral Cap Take 1 capsule (10 mg total) by mouth daily.      amLODIPine 5 MG Oral Tab Take 1 tablet (5 mg total) by mouth daily. 90 tablet 1    semaglutide-weight management (WEGOVY) 2.4 MG/0.75ML Subcutaneous Solution Auto-injector INJECT 0.75ML INTO SKIN ONCE WEEKLY 3 mL 2    valsartan 160 MG Oral Tab Take 1 tablet (160 mg total) by mouth daily. 90 tablet 0    Pancrelipase, Lip-Prot-Amyl, (ZENPEP) 85076-364571 units Oral Cap DR Particles Take 60,000 Units by mouth 3 (three) times daily with meals. 270 capsule 1    Melatonin 10 MG Oral Tab Take 1 tablet by mouth nightly as needed.       No current facility-administered medications on file prior to visit.

## 2025-07-21 NOTE — PATIENT INSTRUCTIONS
Next steps:  1.  Fill your prescribed medication and take as discussed and prescribed: stop wegovy  Will trial medications: zepbound 7.5mg weekly   2.  Continue with meeting with dietitian as directed      Please try to work on the following dietary changes:  Daily protein recommendation to start:  grams  Daily carbohydrate:  <180g  Daily calories: 2,000  1.  Drink water with meals and throughout the day, cut down on soda and/or juice if consumed. Consider flavored water options like Bubbly, Spindrift, Hint and Chris. Reduce alcohol servings to 4 per week maximum.  2.  Have protein with each meal, examples include: greek yogurt, cottage cheese, hard boiled egg, tofu, chicken, fish, or tuna. Recommended daily protein intake is 100 grams/day.   3.  Work towards reducing/eliminating refined carbohydrates and sugars which includes items such as sweets, as well as rice, pasta, and bread and make sure to choose whole grain options when having them with just 1 serving per meal about the size of your inner palm.  4.  Consume non starchy veggies daily working towards making them a good 50% of your daily food intake. Add them to lunch and dinner consistently.  5.  Start a daily probiotic: VSL#3 is recommended, (order on line at www.vsl3.com). Take 1 capsule daily with water for 30 days, then reduce to 1 every other day (this will reduce the cost). Capsules can be left out of refrigerator for 2 weeks. I recommend using a pill box weekly and keeping the bottle in the fridge.     Please download torsten My Fitness Pal, LoseIt! Or My Net Diary to monitor daily dietary intake and you will be able to see if you are eating the right amount of calories or too much or too little which would hinder weight loss. Additionally this will help to see your daily carbohydrate and protein intake. When you set the torsten up choose 1.5 lbs/week as a goal.  Keeping a paper food journal is an option as well to remain accountable for your choices-  this is the start to mindful eating! A low calorie diet has been consistently shown to support weight loss.     Continue or start exercising to help establish a routine. If not already exercising begin with 1 day/week and progress as able with the goal of working towards 30 minutes 5 days a week at a minimum. A variety or cardio, strength and stretching is important. Review resources below to help support you in building this healthy routine.     Meditation daily can help manage and control stress. Chronic stress can make weight loss difficult.  Exercising is one way to help with stress, but meditation using the CALM John or another comparable alternative can be done in your home or place of work with little time commitment. This John can also help work on behavior change and improve sleep. Check out the segment under Calm Masterclass and listen to The 4 Pillars of Health. A great way to begin learning about the foundation of lifestyle with practical tips to use in your every day. In addition, we offer counseling services and support for individual connection and care. A referral is necessary so please let me know if this is a service you are interested.     Check out www.yourweightmatters.org blog for continued support and education along your weight loss journey to optimal health!  Patient Resources:     Personal Training/Fitness Classes/Health Coaching     [facility] in [place]: Full fitness center with group fitness and personal training located in [place].  Health Coaching with Jackson Johnson, and Anoop Mcmullen at our [facility]- individual coaching to work on your health goals. Call [phone] and/or email @ [email or web address]. Free 60 minute consult when client of [employer] [facility].  RAYMOND Razo @ http://www.dale.[employer]. A variety of group fitness options plus various yoga classes [phone] and/or email Angely michael  [email or web address] [place] [place] [address].[facility]  [facility] Hospital[facility] Fitness Centers with multiple locations: BABL Media Fitness (www.IO Semiconductor.Segetis), F45 Training (www.o37qsvyovda.Segetis), TeacherTube Body Bootcamp (www.XekobodybootStateless Networksp.Segetis), NetAmerica Alliance (www.fanbook Inc..Segetis), The Exercise  (www.exercisecoach.com), Club Pilates (www.clubpilates.Segetis)     Online Fitness  Fitness  on Utube  Fit in 10 DVD series                              www.jbwkl24PHFKXEN  Chair exercises via Sit and Be Fit (www.sitandbefit.org) and clickworker GmbH (www.Naked Wines.com) or Walter Juarez or Kendrick Casas videos on YouTube.  Hip Hop Fit with Anthony Berry at www.hiphopfit.Nimble Apps Limited     Apps for on the Go Fitness  [place] 7 Minute Workout (orange box with white 7) - free on the go HIIT training john  Peloton John @ www.onepeloton.com     Nutrition Trackers, Meal Preparation, and Other Meal Programs  LoseIT! And My Fitness Pal apps and on line for tracking nutrition  NOOM - virtual health coaching  FitFoundation (healthy meals on the go) in [place] @ www.zzzvemmccmfmn8dKXEN  Mayelin MADRID @ [employer] Private LimitedbistrBuyPlayWin and Thnnis76 (calorie smart and low carb plans recommended) @ www.nrnhpd68.com, Metabolic Meals @ www.Sorbent TherapeuticsMetabolicMeals.com - individual prepared meals to go  Gobble, Blue Apron, Home , Every Plate, Sunbasket- on line meal delivery programs for preparation at home  Meal Village in [place] for homemade meals to go @ www.mealvillage.com  Diet Doctor @ www.dietdoctor.com - low carb swaps  ReciMe and Mealime john (grocery and meal planning)     Stress, Anxiety, Depression, Trauma  CALM meditation john (www.calm.com)  Headspace  Don't let anxiety run your life. Using the science of emotion regulation and mindfulness to overcome fear and worry by Donis Toribio PsyD and Sandip Marsh MA.  The Joobili Podcast (September 27, 2023): 6 Magic Words That Stop Anxiety  What Happened to You?- a look at the impact trauma has on behavior written by Ryan  Amena and Dr. Mohsen Eagle  Whole Again by Juan He - discovering your true self after trauma     Mindful Eating/The Hungry Brain  Am I Hungry? Mindful eating virtual  torsten (www.amihungry.com)  The Hungry Brain by Sridevi Castellon, PhD  Mindless Eating by Salvador Arias  Weight Loss Surgery Will Not Treat Food Addiction by Selena Castillo Ph.D     Metabolic Dysfunction, Hormones and Cravings  Why We Get Sick? By Jose Modi (insulin resistance)  Your Body in Balance: The New Science of Food, Hormones, and Health by Dr. Pastor Fine  The Complete Guide to fasting by Dr. Justice  Fast Like a Girl by Dr. Sultana Lalmas  The M Factor (documentary on PBS about Menopause)  Sugar, Salt & Fat by Mariela Feliciano, Ph.D, R.D.  The Truth About Sugar - documentary on sugar (Free on Context app, https://Powderhook.be/4C7hzdvUJ4x)  Presentation on SUGAR called Sugar: The Bitter Truth by Dr. Ko Orellana (Context app) https://Powderhook.sickweather/dBnniua6-oM?si=ebsyh7tjm8ny2lis  Reverse Visceral Fat: #1 Way to Increase Your Lifespan & End Inflammation with Dr. Jamie Vargas on Utube @ https://Powderhook.be/nupPRnvUpJY?si=tw5ufyApGUN1RwxN     Nutrition Support  You Are What You Eat - Netfix series on twin study looking at impact of nutrition changes on health  The End of Dieting: How to Live for Life by Dr. Nicola Fitch M.D. or listen to The Intacct Podcast Episode 63: Understanding \"Nutritarian\" Eating w/Dr. Nicola Fitch  The Game Changers- Dexcomix Documentary on plant based nutrition  The Dr. Chung T5 Wellness Plan by Dr. Tim Chung MD  The Complete Guide to fasting by Dr. Justice  @[facility] (InstMercy Healtham Dietician with support surrounding nutrition and meal prep/planning)     Education, Motivation and Support Resources  Live to 100: Secrets of the Blue Zones - Netflix series on the secrets to communities living over 100 years old  Atomic Habits by Manav Herrera (a book about taking small steps to promote greater behavior change)   Motivation torsten (black  box with white \")- daily supportive messages sent to your phone  Can't Hurt Me by Donis Mcintosh (a book exploring the power of discipline in achieving your goals)  Fed Up - documentary about obesity (Free on Utube)  Www.yourweightmatters.org - Obesity Action Coalition sponsored Blog posts  Obesity Action Coalition Resources on topics specific to weight management (www.obesityaction.org)  Fitlosophy Fitspiration - journal to better health (journal book found at [employer] in fitness aisle)  Roseann Viera talk titled: The Call to Courage (Netflix)  The Exam Room by the Physician's Committee (Podcast)  Nutrition Facts by Dr. Brady (Podcast)

## 2025-07-21 NOTE — TELEPHONE ENCOUNTER
Call from [facility] pharmacy- requesting call back to confirm Zepbound 7.5mg script is accurate as they do not have documentation patient has been on any previous Zepbound doses.     Spoke with Samantha (pharmacist) at [facility]- confirmed patient is switching from Wegovy 2.4mg to Zepbound 7.5mg.  Samantha confirms they will dispense to patient.

## 2025-07-28 DIAGNOSIS — I10 ESSENTIAL HYPERTENSION: ICD-10-CM

## 2025-08-06 RX ORDER — VALSARTAN 160 MG/1
160 TABLET ORAL DAILY
Qty: 90 TABLET | Refills: 3 | Status: SHIPPED | OUTPATIENT
Start: 2025-08-06

## (undated) DIAGNOSIS — I10 ESSENTIAL HYPERTENSION: ICD-10-CM

## (undated) DIAGNOSIS — M25.512 ACUTE PAIN OF LEFT SHOULDER: Primary | ICD-10-CM

## (undated) DIAGNOSIS — I10 ESSENTIAL HYPERTENSION: Primary | ICD-10-CM

## (undated) DEVICE — CATHETER URETH 10FR INTMIT RED RUB

## (undated) DEVICE — SUCTION COAGULATOR: Brand: VALLEYLAB

## (undated) DEVICE — PROCISE XP WAND: Brand: COBLATION

## (undated) DEVICE — PENCIL SMK EVAC L10FT MPLR BLDE JAW OPN

## (undated) DEVICE — SPLINT INTNSL W0.6XL2IN CHITOSAN POLYMR

## (undated) DEVICE — FILTERLINE NASAL ADULT O2/CO2

## (undated) DEVICE — PACK CDS SINUS

## (undated) DEVICE — 1200CC GUARDIAN II: Brand: GUARDIAN

## (undated) DEVICE — SPONGE: SPECIALTY TONSIL XR MED 100/CS: Brand: MEDICAL ACTION INDUSTRIES

## (undated) DEVICE — SOLUTION IRRIG 1000ML 0.9% NACL USP BTL

## (undated) DEVICE — ENDOSCOPY PACK - LOWER: Brand: MEDLINE INDUSTRIES, INC.

## (undated) DEVICE — DALE NASAL DRESSING HOLDER, FITS MOST: Brand: DALE NASAL DRESSING HOLDER

## (undated) DEVICE — ELECTRODE ES 2.75IN PTFE BLDE MOD E-Z CLN

## (undated) DEVICE — SKIN REG/FINE DUAL MARKER, RULER, LABELS: Brand: MEDLINE

## (undated) DEVICE — SOLUTION IRRIG 1000ML ST H2O AQUALITE PLAS

## (undated) DEVICE — 3M™ RED DOT™ MONITORING ELECTRODE WITH FOAM TAPE AND STICKY GEL, 50/BAG, 20/CASE, 72/PLT 2570: Brand: RED DOT™

## (undated) DEVICE — SLEEVE COMPR MD KNEE LEN SGL USE KENDALL SCD

## (undated) DEVICE — GLOVE SUR 7.5 SENSICARE PI PIP CRM PWD F

## (undated) DEVICE — ENTACT SEPTAL STAPLER 3 PACK: Brand: ENT SINUS

## (undated) DEVICE — KIT,ANTI FOG,W/SPONGE & FLUID,SOFT PACK: Brand: MEDLINE

## (undated) DEVICE — Device: Brand: DEFENDO AIR/WATER/SUCTION AND BIOPSY VALVE

## (undated) DEVICE — FORCEP BIOPSY RJ4 LG CAP W/ND

## (undated) NOTE — LETTER
OUTSIDE TESTING RESULT REQUEST     IMPORTANT: FOR YOUR IMMEDIATE ATTENTION  Please FAX all test results listed below to: [phone]     Testing already done on or about: asap     * * * * If testing is NOT complete, arrange with patient A.S.A.P. * * * *      Patient Name: Renzo Stevens  Surgery Date: 2024  Medical Record: [id number]  CSN: [id number]  : 1990 - A: 34 y     Sex: male  Surgeon(s):  Todd Mendiola MD  Procedure: Nasal Septoplasty; Bilateral Out Fracture of Inferior Turbinates; Bilateral Submucous Resection of Inferior Turbinates; Bilateral Tonsillectomy  Anesthesia Type: General     Surgeon: Todd Mendiola MD     The following Testing and Time Line are REQUIRED PER ANESTHESIA     EKG READ AND SIGNED WITHIN   90 days  BMP (requires 4 hour fast) within  90 days      Thank You,   Sent by:YAIR

## (undated) NOTE — LETTER
[address] [place], [facility]. [place] Marilyn [address] [place] [address], [address] [place] [address] [phone]            September 9, 2021      [address] [place] [address],[address]      Dear Mr. Anitha Hoff

## (undated) NOTE — LETTER
[address] [place], [facility]. Nav Sanders [address] [facility], [address] [place] [phone]            August 11, 2021      [address] [place] [address] [address]      Dear Mr. Bev Gipson